# Patient Record
Sex: FEMALE | Race: WHITE | NOT HISPANIC OR LATINO | Employment: OTHER | URBAN - METROPOLITAN AREA
[De-identification: names, ages, dates, MRNs, and addresses within clinical notes are randomized per-mention and may not be internally consistent; named-entity substitution may affect disease eponyms.]

---

## 2017-01-01 ENCOUNTER — ANESTHESIA (OUTPATIENT)
Dept: PERIOP | Facility: HOSPITAL | Age: 82
End: 2017-01-01

## 2017-01-01 ENCOUNTER — APPOINTMENT (OUTPATIENT)
Dept: GENERAL RADIOLOGY | Facility: HOSPITAL | Age: 82
End: 2017-01-01

## 2017-01-01 ENCOUNTER — HOSPITAL ENCOUNTER (INPATIENT)
Facility: HOSPITAL | Age: 82
LOS: 5 days | Discharge: NURSING, REHAB OR LONG-TERM CARE | End: 2017-08-21
Attending: EMERGENCY MEDICINE | Admitting: INTERNAL MEDICINE

## 2017-01-01 ENCOUNTER — ANESTHESIA EVENT (OUTPATIENT)
Dept: PERIOP | Facility: HOSPITAL | Age: 82
End: 2017-01-01

## 2017-01-01 ENCOUNTER — APPOINTMENT (OUTPATIENT)
Dept: CT IMAGING | Facility: HOSPITAL | Age: 82
End: 2017-01-01

## 2017-01-01 ENCOUNTER — APPOINTMENT (OUTPATIENT)
Dept: CT IMAGING | Facility: HOSPITAL | Age: 82
End: 2017-01-01
Attending: HOSPITALIST

## 2017-01-01 ENCOUNTER — HOSPITAL ENCOUNTER (INPATIENT)
Facility: HOSPITAL | Age: 82
LOS: 6 days | End: 2017-09-20
Attending: INTERNAL MEDICINE | Admitting: INTERNAL MEDICINE

## 2017-01-01 VITALS
HEART RATE: 75 BPM | WEIGHT: 149.91 LBS | DIASTOLIC BLOOD PRESSURE: 69 MMHG | HEIGHT: 62 IN | OXYGEN SATURATION: 95 % | BODY MASS INDEX: 27.59 KG/M2 | RESPIRATION RATE: 16 BRPM | SYSTOLIC BLOOD PRESSURE: 120 MMHG | TEMPERATURE: 96.6 F

## 2017-01-01 VITALS
DIASTOLIC BLOOD PRESSURE: 82 MMHG | SYSTOLIC BLOOD PRESSURE: 150 MMHG | HEIGHT: 58 IN | TEMPERATURE: 98.5 F | OXYGEN SATURATION: 95 % | HEART RATE: 72 BPM | RESPIRATION RATE: 20 BRPM | BODY MASS INDEX: 28.74 KG/M2 | WEIGHT: 136.9 LBS

## 2017-01-01 DIAGNOSIS — D68.32 WARFARIN-INDUCED COAGULOPATHY (HCC): ICD-10-CM

## 2017-01-01 DIAGNOSIS — S09.90XA CLOSED HEAD INJURY, INITIAL ENCOUNTER: ICD-10-CM

## 2017-01-01 DIAGNOSIS — S72.012A SUBCAPITAL FRACTURE OF HIP, LEFT, CLOSED, INITIAL ENCOUNTER (HCC): Primary | ICD-10-CM

## 2017-01-01 DIAGNOSIS — Z96.649 INSTABILITY OF PROSTHETIC HIP, INITIAL ENCOUNTER (HCC): Primary | ICD-10-CM

## 2017-01-01 DIAGNOSIS — R26.2 DIFFICULTY WALKING: ICD-10-CM
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
## 2017-01-01 DIAGNOSIS — T84.028A INSTABILITY OF PROSTHETIC HIP, INITIAL ENCOUNTER (HCC): Primary | ICD-10-CM

## 2017-01-01 DIAGNOSIS — T45.515A WARFARIN-INDUCED COAGULOPATHY (HCC): ICD-10-CM

## 2017-01-01 LAB
ABO + RH BLD: NORMAL
ABO GROUP BLD: NORMAL
ABO GROUP BLD: NORMAL
ALBUMIN SERPL-MCNC: 2.8 G/DL (ref 3.5–5.2)
ALBUMIN SERPL-MCNC: 2.9 G/DL (ref 3.5–5.2)
ALBUMIN SERPL-MCNC: 3.1 G/DL (ref 3.5–5.2)
ALBUMIN SERPL-MCNC: 3.9 G/DL (ref 3.5–5.2)
ALBUMIN/GLOB SERPL: 0.8 G/DL
ALBUMIN/GLOB SERPL: 0.8 G/DL
ALBUMIN/GLOB SERPL: 0.9 G/DL
ALBUMIN/GLOB SERPL: 1 G/DL
ALP SERPL-CCNC: 101 U/L (ref 39–117)
ALP SERPL-CCNC: 102 U/L (ref 39–117)
ALP SERPL-CCNC: 97 U/L (ref 39–117)
ALP SERPL-CCNC: 99 U/L (ref 39–117)
ALT SERPL W P-5'-P-CCNC: 15 U/L (ref 1–33)
ALT SERPL W P-5'-P-CCNC: 15 U/L (ref 1–33)
ALT SERPL W P-5'-P-CCNC: 18 U/L (ref 1–33)
ALT SERPL W P-5'-P-CCNC: 25 U/L (ref 1–33)
ANION GAP SERPL CALCULATED.3IONS-SCNC: 11.1 MMOL/L
ANION GAP SERPL CALCULATED.3IONS-SCNC: 12.7 MMOL/L
ANION GAP SERPL CALCULATED.3IONS-SCNC: 13.2 MMOL/L
ANION GAP SERPL CALCULATED.3IONS-SCNC: 13.4 MMOL/L
ANION GAP SERPL CALCULATED.3IONS-SCNC: 14.2 MMOL/L
ANION GAP SERPL CALCULATED.3IONS-SCNC: 14.2 MMOL/L
ANION GAP SERPL CALCULATED.3IONS-SCNC: 15.6 MMOL/L
ANION GAP SERPL CALCULATED.3IONS-SCNC: 15.7 MMOL/L
ANION GAP SERPL CALCULATED.3IONS-SCNC: 15.9 MMOL/L
ANION GAP SERPL CALCULATED.3IONS-SCNC: 17.1 MMOL/L
AST SERPL-CCNC: 32 U/L (ref 1–32)
AST SERPL-CCNC: 57 U/L (ref 1–32)
AST SERPL-CCNC: 70 U/L (ref 1–32)
AST SERPL-CCNC: 96 U/L (ref 1–32)
BASOPHILS # BLD AUTO: 0 10*3/MM3 (ref 0–0.2)
BASOPHILS # BLD AUTO: 0 10*3/MM3 (ref 0–0.2)
BASOPHILS # BLD AUTO: 0.01 10*3/MM3 (ref 0–0.2)
BASOPHILS NFR BLD AUTO: 0 % (ref 0–1.5)
BASOPHILS NFR BLD AUTO: 0 % (ref 0–1.5)
BASOPHILS NFR BLD AUTO: 0.1 % (ref 0–1.5)
BASOPHILS NFR BLD AUTO: 0.2 % (ref 0–1.5)
BASOPHILS NFR BLD AUTO: 0.2 % (ref 0–1.5)
BH BB BLOOD EXPIRATION DATE: NORMAL
BH BB BLOOD TYPE BARCODE: 600
BH BB BLOOD TYPE BARCODE: 6200
BH BB BLOOD TYPE BARCODE: 6200
BH BB DISPENSE STATUS: NORMAL
BH BB PRODUCT CODE: NORMAL
BH BB UNIT NUMBER: NORMAL
BILIRUB SERPL-MCNC: 0.5 MG/DL (ref 0.1–1.2)
BILIRUB SERPL-MCNC: 0.7 MG/DL (ref 0.1–1.2)
BILIRUB SERPL-MCNC: 0.8 MG/DL (ref 0.1–1.2)
BILIRUB SERPL-MCNC: 0.9 MG/DL (ref 0.1–1.2)
BLD GP AB SCN SERPL QL: NEGATIVE
BLD GP AB SCN SERPL QL: NEGATIVE
BUN BLD-MCNC: 17 MG/DL (ref 8–23)
BUN BLD-MCNC: 18 MG/DL (ref 8–23)
BUN BLD-MCNC: 23 MG/DL (ref 8–23)
BUN BLD-MCNC: 23 MG/DL (ref 8–23)
BUN BLD-MCNC: 24 MG/DL (ref 8–23)
BUN BLD-MCNC: 25 MG/DL (ref 8–23)
BUN BLD-MCNC: 29 MG/DL (ref 8–23)
BUN BLD-MCNC: 34 MG/DL (ref 8–23)
BUN BLD-MCNC: 39 MG/DL (ref 8–23)
BUN BLD-MCNC: 40 MG/DL (ref 8–23)
BUN/CREAT SERPL: 23.6 (ref 7–25)
BUN/CREAT SERPL: 24 (ref 7–25)
BUN/CREAT SERPL: 25.3 (ref 7–25)
BUN/CREAT SERPL: 26.1 (ref 7–25)
BUN/CREAT SERPL: 34.9 (ref 7–25)
BUN/CREAT SERPL: 39.1 (ref 7–25)
BUN/CREAT SERPL: 39.1 (ref 7–25)
BUN/CREAT SERPL: 39.3 (ref 7–25)
BUN/CREAT SERPL: 47.1 (ref 7–25)
BUN/CREAT SERPL: 48.8 (ref 7–25)
CALCIUM SPEC-SCNC: 8.4 MG/DL (ref 8.6–10.5)
CALCIUM SPEC-SCNC: 8.5 MG/DL (ref 8.6–10.5)
CALCIUM SPEC-SCNC: 8.8 MG/DL (ref 8.6–10.5)
CALCIUM SPEC-SCNC: 8.9 MG/DL (ref 8.6–10.5)
CALCIUM SPEC-SCNC: 8.9 MG/DL (ref 8.6–10.5)
CALCIUM SPEC-SCNC: 9.1 MG/DL (ref 8.6–10.5)
CALCIUM SPEC-SCNC: 9.3 MG/DL (ref 8.6–10.5)
CALCIUM SPEC-SCNC: 9.3 MG/DL (ref 8.6–10.5)
CALCIUM SPEC-SCNC: 9.5 MG/DL (ref 8.6–10.5)
CALCIUM SPEC-SCNC: 9.6 MG/DL (ref 8.6–10.5)
CHLORIDE SERPL-SCNC: 100 MMOL/L (ref 98–107)
CHLORIDE SERPL-SCNC: 101 MMOL/L (ref 98–107)
CHLORIDE SERPL-SCNC: 105 MMOL/L (ref 98–107)
CHLORIDE SERPL-SCNC: 94 MMOL/L (ref 98–107)
CHLORIDE SERPL-SCNC: 98 MMOL/L (ref 98–107)
CHLORIDE SERPL-SCNC: 98 MMOL/L (ref 98–107)
CHLORIDE SERPL-SCNC: 99 MMOL/L (ref 98–107)
CHLORIDE SERPL-SCNC: 99 MMOL/L (ref 98–107)
CO2 SERPL-SCNC: 17.4 MMOL/L (ref 22–29)
CO2 SERPL-SCNC: 17.9 MMOL/L (ref 22–29)
CO2 SERPL-SCNC: 19.8 MMOL/L (ref 22–29)
CO2 SERPL-SCNC: 20.3 MMOL/L (ref 22–29)
CO2 SERPL-SCNC: 20.9 MMOL/L (ref 22–29)
CO2 SERPL-SCNC: 21.1 MMOL/L (ref 22–29)
CO2 SERPL-SCNC: 21.6 MMOL/L (ref 22–29)
CO2 SERPL-SCNC: 24.3 MMOL/L (ref 22–29)
CREAT BLD-MCNC: 0.61 MG/DL (ref 0.57–1)
CREAT BLD-MCNC: 0.64 MG/DL (ref 0.57–1)
CREAT BLD-MCNC: 0.72 MG/DL (ref 0.57–1)
CREAT BLD-MCNC: 0.75 MG/DL (ref 0.57–1)
CREAT BLD-MCNC: 0.8 MG/DL (ref 0.57–1)
CREAT BLD-MCNC: 0.83 MG/DL (ref 0.57–1)
CREAT BLD-MCNC: 0.85 MG/DL (ref 0.57–1)
CREAT BLD-MCNC: 0.87 MG/DL (ref 0.57–1)
CREAT BLD-MCNC: 0.88 MG/DL (ref 0.57–1)
CREAT BLD-MCNC: 0.91 MG/DL (ref 0.57–1)
DEPRECATED RDW RBC AUTO: 58 FL (ref 37–54)
DEPRECATED RDW RBC AUTO: 58.6 FL (ref 37–54)
DEPRECATED RDW RBC AUTO: 58.7 FL (ref 37–54)
DEPRECATED RDW RBC AUTO: 60.8 FL (ref 37–54)
DEPRECATED RDW RBC AUTO: 61 FL (ref 37–54)
DEPRECATED RDW RBC AUTO: 66 FL (ref 37–54)
DEPRECATED RDW RBC AUTO: 66.1 FL (ref 37–54)
DEPRECATED RDW RBC AUTO: 66.3 FL (ref 37–54)
DEPRECATED RDW RBC AUTO: 66.4 FL (ref 37–54)
DEPRECATED RDW RBC AUTO: 67 FL (ref 37–54)
EOSINOPHIL # BLD AUTO: 0.02 10*3/MM3 (ref 0–0.7)
EOSINOPHIL # BLD AUTO: 0.04 10*3/MM3 (ref 0–0.7)
EOSINOPHIL # BLD AUTO: 0.05 10*3/MM3 (ref 0–0.7)
EOSINOPHIL # BLD AUTO: 0.07 10*3/MM3 (ref 0–0.7)
EOSINOPHIL # BLD AUTO: 0.1 10*3/MM3 (ref 0–0.7)
EOSINOPHIL # BLD AUTO: 0.11 10*3/MM3 (ref 0–0.7)
EOSINOPHIL # BLD AUTO: 0.14 10*3/MM3 (ref 0–0.7)
EOSINOPHIL # BLD AUTO: 0.17 10*3/MM3 (ref 0–0.7)
EOSINOPHIL NFR BLD AUTO: 0.3 % (ref 0.3–6.2)
EOSINOPHIL NFR BLD AUTO: 0.7 % (ref 0.3–6.2)
EOSINOPHIL NFR BLD AUTO: 0.7 % (ref 0.3–6.2)
EOSINOPHIL NFR BLD AUTO: 0.8 % (ref 0.3–6.2)
EOSINOPHIL NFR BLD AUTO: 1.9 % (ref 0.3–6.2)
EOSINOPHIL NFR BLD AUTO: 1.9 % (ref 0.3–6.2)
EOSINOPHIL NFR BLD AUTO: 2.3 % (ref 0.3–6.2)
EOSINOPHIL NFR BLD AUTO: 2.5 % (ref 0.3–6.2)
ERYTHROCYTE [DISTWIDTH] IN BLOOD BY AUTOMATED COUNT: 15.3 % (ref 11.7–13)
ERYTHROCYTE [DISTWIDTH] IN BLOOD BY AUTOMATED COUNT: 15.4 % (ref 11.7–13)
ERYTHROCYTE [DISTWIDTH] IN BLOOD BY AUTOMATED COUNT: 15.5 % (ref 11.7–13)
ERYTHROCYTE [DISTWIDTH] IN BLOOD BY AUTOMATED COUNT: 15.9 % (ref 11.7–13)
ERYTHROCYTE [DISTWIDTH] IN BLOOD BY AUTOMATED COUNT: 15.9 % (ref 11.7–13)
ERYTHROCYTE [DISTWIDTH] IN BLOOD BY AUTOMATED COUNT: 18.1 % (ref 11.7–13)
ERYTHROCYTE [DISTWIDTH] IN BLOOD BY AUTOMATED COUNT: 18.2 % (ref 11.7–13)
ERYTHROCYTE [DISTWIDTH] IN BLOOD BY AUTOMATED COUNT: 18.2 % (ref 11.7–13)
ERYTHROCYTE [DISTWIDTH] IN BLOOD BY AUTOMATED COUNT: 18.3 % (ref 11.7–13)
ERYTHROCYTE [DISTWIDTH] IN BLOOD BY AUTOMATED COUNT: 18.6 % (ref 11.7–13)
GFR SERPL CREATININE-BSD FRML MDRD: 58 ML/MIN/1.73
GFR SERPL CREATININE-BSD FRML MDRD: 61 ML/MIN/1.73
GFR SERPL CREATININE-BSD FRML MDRD: 62 ML/MIN/1.73
GFR SERPL CREATININE-BSD FRML MDRD: 63 ML/MIN/1.73
GFR SERPL CREATININE-BSD FRML MDRD: 65 ML/MIN/1.73
GFR SERPL CREATININE-BSD FRML MDRD: 68 ML/MIN/1.73
GFR SERPL CREATININE-BSD FRML MDRD: 73 ML/MIN/1.73
GFR SERPL CREATININE-BSD FRML MDRD: 77 ML/MIN/1.73
GFR SERPL CREATININE-BSD FRML MDRD: 88 ML/MIN/1.73
GFR SERPL CREATININE-BSD FRML MDRD: 93 ML/MIN/1.73
GLOBULIN UR ELPH-MCNC: 3.4 GM/DL
GLOBULIN UR ELPH-MCNC: 3.5 GM/DL
GLOBULIN UR ELPH-MCNC: 3.5 GM/DL
GLOBULIN UR ELPH-MCNC: 4 GM/DL
GLUCOSE BLD-MCNC: 102 MG/DL (ref 65–99)
GLUCOSE BLD-MCNC: 104 MG/DL (ref 65–99)
GLUCOSE BLD-MCNC: 107 MG/DL (ref 65–99)
GLUCOSE BLD-MCNC: 109 MG/DL (ref 65–99)
GLUCOSE BLD-MCNC: 111 MG/DL (ref 65–99)
GLUCOSE BLD-MCNC: 112 MG/DL (ref 65–99)
GLUCOSE BLD-MCNC: 112 MG/DL (ref 65–99)
GLUCOSE BLD-MCNC: 115 MG/DL (ref 65–99)
GLUCOSE BLD-MCNC: 117 MG/DL (ref 65–99)
GLUCOSE BLD-MCNC: 98 MG/DL (ref 65–99)
GLUCOSE BLDC GLUCOMTR-MCNC: 110 MG/DL (ref 70–130)
HCT VFR BLD AUTO: 23.7 % (ref 35.6–45.5)
HCT VFR BLD AUTO: 26.1 % (ref 35.6–45.5)
HCT VFR BLD AUTO: 27.9 % (ref 35.6–45.5)
HCT VFR BLD AUTO: 27.9 % (ref 35.6–45.5)
HCT VFR BLD AUTO: 28.5 % (ref 35.6–45.5)
HCT VFR BLD AUTO: 29.3 % (ref 35.6–45.5)
HCT VFR BLD AUTO: 30.7 % (ref 35.6–45.5)
HCT VFR BLD AUTO: 31.8 % (ref 35.6–45.5)
HCT VFR BLD AUTO: 32.4 % (ref 35.6–45.5)
HCT VFR BLD AUTO: 33.7 % (ref 35.6–45.5)
HGB BLD-MCNC: 10.1 G/DL (ref 11.9–15.5)
HGB BLD-MCNC: 10.1 G/DL (ref 11.9–15.5)
HGB BLD-MCNC: 10.2 G/DL (ref 11.9–15.5)
HGB BLD-MCNC: 10.8 G/DL (ref 11.9–15.5)
HGB BLD-MCNC: 7.9 G/DL (ref 11.9–15.5)
HGB BLD-MCNC: 8.2 G/DL (ref 11.9–15.5)
HGB BLD-MCNC: 8.8 G/DL (ref 11.9–15.5)
HGB BLD-MCNC: 8.9 G/DL (ref 11.9–15.5)
HGB BLD-MCNC: 9.2 G/DL (ref 11.9–15.5)
HGB BLD-MCNC: 9.6 G/DL (ref 11.9–15.5)
IMM GRANULOCYTES # BLD: 0 10*3/MM3 (ref 0–0.03)
IMM GRANULOCYTES # BLD: 0.02 10*3/MM3 (ref 0–0.03)
IMM GRANULOCYTES # BLD: 0.03 10*3/MM3 (ref 0–0.03)
IMM GRANULOCYTES # BLD: 0.03 10*3/MM3 (ref 0–0.03)
IMM GRANULOCYTES NFR BLD: 0 % (ref 0–0.5)
IMM GRANULOCYTES NFR BLD: 0.3 % (ref 0–0.5)
IMM GRANULOCYTES NFR BLD: 0.4 % (ref 0–0.5)
IMM GRANULOCYTES NFR BLD: 0.4 % (ref 0–0.5)
INR PPP: 1.29 (ref 0.9–1.1)
INR PPP: 1.34 (ref 0.9–1.1)
INR PPP: 1.39 (ref 0.9–1.1)
INR PPP: 1.49 (ref 0.9–1.1)
INR PPP: 1.82 (ref 0.9–1.1)
INR PPP: 1.86 (ref 0.9–1.1)
INR PPP: 1.88 (ref 0.9–1.1)
INR PPP: 3.69 (ref 0.9–1.1)
INR PPP: 4.4 (ref 0.9–1.1)
INR PPP: 4.45 (ref 0.9–1.1)
INR PPP: 4.48 (ref 0.9–1.1)
INR PPP: 5.63 (ref 0.9–1.1)
INR PPP: 5.84 (ref 0.9–1.1)
LYMPHOCYTES # BLD AUTO: 1.27 10*3/MM3 (ref 0.9–4.8)
LYMPHOCYTES # BLD AUTO: 1.43 10*3/MM3 (ref 0.9–4.8)
LYMPHOCYTES # BLD AUTO: 1.53 10*3/MM3 (ref 0.9–4.8)
LYMPHOCYTES # BLD AUTO: 1.6 10*3/MM3 (ref 0.9–4.8)
LYMPHOCYTES # BLD AUTO: 1.75 10*3/MM3 (ref 0.9–4.8)
LYMPHOCYTES # BLD AUTO: 1.76 10*3/MM3 (ref 0.9–4.8)
LYMPHOCYTES # BLD AUTO: 2.19 10*3/MM3 (ref 0.9–4.8)
LYMPHOCYTES # BLD AUTO: 2.2 10*3/MM3 (ref 0.9–4.8)
LYMPHOCYTES NFR BLD AUTO: 19.2 % (ref 19.6–45.3)
LYMPHOCYTES NFR BLD AUTO: 21.1 % (ref 19.6–45.3)
LYMPHOCYTES NFR BLD AUTO: 21.9 % (ref 19.6–45.3)
LYMPHOCYTES NFR BLD AUTO: 23.4 % (ref 19.6–45.3)
LYMPHOCYTES NFR BLD AUTO: 23.5 % (ref 19.6–45.3)
LYMPHOCYTES NFR BLD AUTO: 23.9 % (ref 19.6–45.3)
LYMPHOCYTES NFR BLD AUTO: 32.7 % (ref 19.6–45.3)
LYMPHOCYTES NFR BLD AUTO: 45.1 % (ref 19.6–45.3)
MCH RBC QN AUTO: 31.3 PG (ref 26.9–32)
MCH RBC QN AUTO: 31.7 PG (ref 26.9–32)
MCH RBC QN AUTO: 31.9 PG (ref 26.9–32)
MCH RBC QN AUTO: 32 PG (ref 26.9–32)
MCH RBC QN AUTO: 32.4 PG (ref 26.9–32)
MCH RBC QN AUTO: 32.7 PG (ref 26.9–32)
MCH RBC QN AUTO: 33.2 PG (ref 26.9–32)
MCH RBC QN AUTO: 33.6 PG (ref 26.9–32)
MCH RBC QN AUTO: 33.9 PG (ref 26.9–32)
MCH RBC QN AUTO: 34.6 PG (ref 26.9–32)
MCHC RBC AUTO-ENTMCNC: 31.2 G/DL (ref 32.4–36.3)
MCHC RBC AUTO-ENTMCNC: 31.4 G/DL (ref 32.4–36.3)
MCHC RBC AUTO-ENTMCNC: 31.5 G/DL (ref 32.4–36.3)
MCHC RBC AUTO-ENTMCNC: 31.9 G/DL (ref 32.4–36.3)
MCHC RBC AUTO-ENTMCNC: 32 G/DL (ref 32.4–36.3)
MCHC RBC AUTO-ENTMCNC: 32.1 G/DL (ref 32.4–36.3)
MCHC RBC AUTO-ENTMCNC: 32.3 G/DL (ref 32.4–36.3)
MCHC RBC AUTO-ENTMCNC: 32.8 G/DL (ref 32.4–36.3)
MCHC RBC AUTO-ENTMCNC: 32.9 G/DL (ref 32.4–36.3)
MCHC RBC AUTO-ENTMCNC: 33.3 G/DL (ref 32.4–36.3)
MCV RBC AUTO: 101 FL (ref 80.5–98.2)
MCV RBC AUTO: 101.1 FL (ref 80.5–98.2)
MCV RBC AUTO: 103.5 FL (ref 80.5–98.2)
MCV RBC AUTO: 103.7 FL (ref 80.5–98.2)
MCV RBC AUTO: 103.9 FL (ref 80.5–98.2)
MCV RBC AUTO: 104 FL (ref 80.5–98.2)
MCV RBC AUTO: 104.9 FL (ref 80.5–98.2)
MCV RBC AUTO: 97.2 FL (ref 80.5–98.2)
MCV RBC AUTO: 99.3 FL (ref 80.5–98.2)
MCV RBC AUTO: 99.6 FL (ref 80.5–98.2)
MONOCYTES # BLD AUTO: 0.44 10*3/MM3 (ref 0.2–1.2)
MONOCYTES # BLD AUTO: 0.59 10*3/MM3 (ref 0.2–1.2)
MONOCYTES # BLD AUTO: 0.6 10*3/MM3 (ref 0.2–1.2)
MONOCYTES # BLD AUTO: 0.68 10*3/MM3 (ref 0.2–1.2)
MONOCYTES # BLD AUTO: 0.74 10*3/MM3 (ref 0.2–1.2)
MONOCYTES # BLD AUTO: 0.76 10*3/MM3 (ref 0.2–1.2)
MONOCYTES # BLD AUTO: 0.81 10*3/MM3 (ref 0.2–1.2)
MONOCYTES # BLD AUTO: 0.82 10*3/MM3 (ref 0.2–1.2)
MONOCYTES NFR BLD AUTO: 10.9 % (ref 5–12)
MONOCYTES NFR BLD AUTO: 11.1 % (ref 5–12)
MONOCYTES NFR BLD AUTO: 12.1 % (ref 5–12)
MONOCYTES NFR BLD AUTO: 9 % (ref 5–12)
MONOCYTES NFR BLD AUTO: 9 % (ref 5–12)
MONOCYTES NFR BLD AUTO: 9.4 % (ref 5–12)
MONOCYTES NFR BLD AUTO: 9.5 % (ref 5–12)
MONOCYTES NFR BLD AUTO: 9.8 % (ref 5–12)
NEUTROPHILS # BLD AUTO: 2.1 10*3/MM3 (ref 1.9–8.1)
NEUTROPHILS # BLD AUTO: 3.43 10*3/MM3 (ref 1.9–8.1)
NEUTROPHILS # BLD AUTO: 3.51 10*3/MM3 (ref 1.9–8.1)
NEUTROPHILS # BLD AUTO: 4.02 10*3/MM3 (ref 1.9–8.1)
NEUTROPHILS # BLD AUTO: 4.26 10*3/MM3 (ref 1.9–8.1)
NEUTROPHILS # BLD AUTO: 4.89 10*3/MM3 (ref 1.9–8.1)
NEUTROPHILS # BLD AUTO: 5.43 10*3/MM3 (ref 1.9–8.1)
NEUTROPHILS # BLD AUTO: 6.47 10*3/MM3 (ref 1.9–8.1)
NEUTROPHILS NFR BLD AUTO: 43 % (ref 42.7–76)
NEUTROPHILS NFR BLD AUTO: 52.6 % (ref 42.7–76)
NEUTROPHILS NFR BLD AUTO: 63.5 % (ref 42.7–76)
NEUTROPHILS NFR BLD AUTO: 63.8 % (ref 42.7–76)
NEUTROPHILS NFR BLD AUTO: 65.8 % (ref 42.7–76)
NEUTROPHILS NFR BLD AUTO: 67.6 % (ref 42.7–76)
NEUTROPHILS NFR BLD AUTO: 67.9 % (ref 42.7–76)
NEUTROPHILS NFR BLD AUTO: 70.6 % (ref 42.7–76)
NRBC BLD MANUAL-RTO: 0 /100 WBC (ref 0–0)
NRBC BLD MANUAL-RTO: 0 /100 WBC (ref 0–0)
PLATELET # BLD AUTO: 129 10*3/MM3 (ref 140–500)
PLATELET # BLD AUTO: 148 10*3/MM3 (ref 140–500)
PLATELET # BLD AUTO: 150 10*3/MM3 (ref 140–500)
PLATELET # BLD AUTO: 169 10*3/MM3 (ref 140–500)
PLATELET # BLD AUTO: 183 10*3/MM3 (ref 140–500)
PLATELET # BLD AUTO: 192 10*3/MM3 (ref 140–500)
PLATELET # BLD AUTO: 202 10*3/MM3 (ref 140–500)
PLATELET # BLD AUTO: 206 10*3/MM3 (ref 140–500)
PLATELET # BLD AUTO: 214 10*3/MM3 (ref 140–500)
PLATELET # BLD AUTO: 222 10*3/MM3 (ref 140–500)
PMV BLD AUTO: 8.5 FL (ref 6–12)
PMV BLD AUTO: 8.9 FL (ref 6–12)
PMV BLD AUTO: 9.1 FL (ref 6–12)
PMV BLD AUTO: 9.2 FL (ref 6–12)
PMV BLD AUTO: 9.2 FL (ref 6–12)
PMV BLD AUTO: 9.3 FL (ref 6–12)
PMV BLD AUTO: 9.3 FL (ref 6–12)
PMV BLD AUTO: 9.4 FL (ref 6–12)
PMV BLD AUTO: 9.4 FL (ref 6–12)
PMV BLD AUTO: 9.8 FL (ref 6–12)
POTASSIUM BLD-SCNC: 3.7 MMOL/L (ref 3.5–5.2)
POTASSIUM BLD-SCNC: 3.8 MMOL/L (ref 3.5–5.2)
POTASSIUM BLD-SCNC: 3.9 MMOL/L (ref 3.5–5.2)
POTASSIUM BLD-SCNC: 4 MMOL/L (ref 3.5–5.2)
POTASSIUM BLD-SCNC: 4.1 MMOL/L (ref 3.5–5.2)
POTASSIUM BLD-SCNC: 4.2 MMOL/L (ref 3.5–5.2)
POTASSIUM BLD-SCNC: 4.3 MMOL/L (ref 3.5–5.2)
POTASSIUM BLD-SCNC: 4.6 MMOL/L (ref 3.5–5.2)
POTASSIUM BLD-SCNC: 4.6 MMOL/L (ref 3.5–5.2)
POTASSIUM BLD-SCNC: 5.3 MMOL/L (ref 3.5–5.2)
PROT SERPL-MCNC: 6.2 G/DL (ref 6–8.5)
PROT SERPL-MCNC: 6.4 G/DL (ref 6–8.5)
PROT SERPL-MCNC: 6.6 G/DL (ref 6–8.5)
PROT SERPL-MCNC: 7.9 G/DL (ref 6–8.5)
PROTHROMBIN TIME: 15.7 SECONDS (ref 11.7–14.2)
PROTHROMBIN TIME: 16.1 SECONDS (ref 11.7–14.2)
PROTHROMBIN TIME: 16.6 SECONDS (ref 11.7–14.2)
PROTHROMBIN TIME: 17.5 SECONDS (ref 11.7–14.2)
PROTHROMBIN TIME: 20.4 SECONDS (ref 11.7–14.2)
PROTHROMBIN TIME: 20.8 SECONDS (ref 11.7–14.2)
PROTHROMBIN TIME: 20.9 SECONDS (ref 11.7–14.2)
PROTHROMBIN TIME: 35.5 SECONDS (ref 11.7–14.2)
PROTHROMBIN TIME: 40.7 SECONDS (ref 11.7–14.2)
PROTHROMBIN TIME: 41.1 SECONDS (ref 11.7–14.2)
PROTHROMBIN TIME: 41.3 SECONDS (ref 11.7–14.2)
PROTHROMBIN TIME: 49.4 SECONDS (ref 11.7–14.2)
PROTHROMBIN TIME: 50.8 SECONDS (ref 11.7–14.2)
RBC # BLD AUTO: 2.28 10*6/MM3 (ref 3.9–5.2)
RBC # BLD AUTO: 2.62 10*6/MM3 (ref 3.9–5.2)
RBC # BLD AUTO: 2.74 10*6/MM3 (ref 3.9–5.2)
RBC # BLD AUTO: 2.76 10*6/MM3 (ref 3.9–5.2)
RBC # BLD AUTO: 2.81 10*6/MM3 (ref 3.9–5.2)
RBC # BLD AUTO: 2.83 10*6/MM3 (ref 3.9–5.2)
RBC # BLD AUTO: 3.09 10*6/MM3 (ref 3.9–5.2)
RBC # BLD AUTO: 3.15 10*6/MM3 (ref 3.9–5.2)
RBC # BLD AUTO: 3.16 10*6/MM3 (ref 3.9–5.2)
RBC # BLD AUTO: 3.25 10*6/MM3 (ref 3.9–5.2)
RH BLD: POSITIVE
RH BLD: POSITIVE
SODIUM BLD-SCNC: 130 MMOL/L (ref 136–145)
SODIUM BLD-SCNC: 131 MMOL/L (ref 136–145)
SODIUM BLD-SCNC: 132 MMOL/L (ref 136–145)
SODIUM BLD-SCNC: 133 MMOL/L (ref 136–145)
SODIUM BLD-SCNC: 134 MMOL/L (ref 136–145)
SODIUM BLD-SCNC: 134 MMOL/L (ref 136–145)
SODIUM BLD-SCNC: 135 MMOL/L (ref 136–145)
SODIUM BLD-SCNC: 135 MMOL/L (ref 136–145)
SODIUM BLD-SCNC: 137 MMOL/L (ref 136–145)
SODIUM BLD-SCNC: 139 MMOL/L (ref 136–145)
TROPONIN T SERPL-MCNC: 2.2 NG/ML (ref 0–0.03)
TROPONIN T SERPL-MCNC: 3.05 NG/ML (ref 0–0.03)
TROPONIN T SERPL-MCNC: <0.01 NG/ML (ref 0–0.03)
TSH SERPL DL<=0.05 MIU/L-ACNC: 10.39 MIU/ML (ref 0.27–4.2)
TSH SERPL DL<=0.05 MIU/L-ACNC: 5.7 MIU/ML (ref 0.27–4.2)
UNIT  ABO: NORMAL
UNIT  RH: NORMAL
WBC NRBC COR # BLD: 12.45 10*3/MM3 (ref 4.5–10.7)
WBC NRBC COR # BLD: 4.88 10*3/MM3 (ref 4.5–10.7)
WBC NRBC COR # BLD: 5.4 10*3/MM3 (ref 4.5–10.7)
WBC NRBC COR # BLD: 6.11 10*3/MM3 (ref 4.5–10.7)
WBC NRBC COR # BLD: 6.69 10*3/MM3 (ref 4.5–10.7)
WBC NRBC COR # BLD: 6.69 10*3/MM3 (ref 4.5–10.7)
WBC NRBC COR # BLD: 7.24 10*3/MM3 (ref 4.5–10.7)
WBC NRBC COR # BLD: 7.55 10*3/MM3 (ref 4.5–10.7)
WBC NRBC COR # BLD: 7.99 10*3/MM3 (ref 4.5–10.7)
WBC NRBC COR # BLD: 9.16 10*3/MM3 (ref 4.5–10.7)

## 2017-01-01 PROCEDURE — 80048 BASIC METABOLIC PNL TOTAL CA: CPT | Performed by: EMERGENCY MEDICINE

## 2017-01-01 PROCEDURE — 86900 BLOOD TYPING SEROLOGIC ABO: CPT | Performed by: HOSPITALIST

## 2017-01-01 PROCEDURE — 97110 THERAPEUTIC EXERCISES: CPT

## 2017-01-01 PROCEDURE — 36430 TRANSFUSION BLD/BLD COMPNT: CPT

## 2017-01-01 PROCEDURE — 25010000002 FENTANYL CITRATE (PF) 100 MCG/2ML SOLUTION: Performed by: ANESTHESIOLOGY

## 2017-01-01 PROCEDURE — 25010000002 PROPOFOL 10 MG/ML EMULSION: Performed by: NURSE ANESTHETIST, CERTIFIED REGISTERED

## 2017-01-01 PROCEDURE — 25010000003 CEFAZOLIN IN D5W 1 GM/50ML SOLUTION: Performed by: ORTHOPAEDIC SURGERY

## 2017-01-01 PROCEDURE — 86850 RBC ANTIBODY SCREEN: CPT | Performed by: HOSPITALIST

## 2017-01-01 PROCEDURE — 97162 PT EVAL MOD COMPLEX 30 MIN: CPT

## 2017-01-01 PROCEDURE — 25010000002 MORPHINE PER 10 MG: Performed by: INTERNAL MEDICINE

## 2017-01-01 PROCEDURE — 80053 COMPREHEN METABOLIC PANEL: CPT | Performed by: HOSPITALIST

## 2017-01-01 PROCEDURE — 85025 COMPLETE CBC W/AUTO DIFF WBC: CPT | Performed by: HOSPITALIST

## 2017-01-01 PROCEDURE — 0SRS0JZ REPLACEMENT OF LEFT HIP JOINT, FEMORAL SURFACE WITH SYNTHETIC SUBSTITUTE, OPEN APPROACH: ICD-10-PCS | Performed by: ORTHOPAEDIC SURGERY

## 2017-01-01 PROCEDURE — C1713 ANCHOR/SCREW BN/BN,TIS/BN: HCPCS | Performed by: ORTHOPAEDIC SURGERY

## 2017-01-01 PROCEDURE — 85610 PROTHROMBIN TIME: CPT | Performed by: INTERNAL MEDICINE

## 2017-01-01 PROCEDURE — 25010000002 SUCCINYLCHOLINE PER 20 MG: Performed by: ANESTHESIOLOGY

## 2017-01-01 PROCEDURE — 84443 ASSAY THYROID STIM HORMONE: CPT | Performed by: HOSPITALIST

## 2017-01-01 PROCEDURE — 93005 ELECTROCARDIOGRAM TRACING: CPT | Performed by: ANESTHESIOLOGY

## 2017-01-01 PROCEDURE — 85027 COMPLETE CBC AUTOMATED: CPT | Performed by: INTERNAL MEDICINE

## 2017-01-01 PROCEDURE — 80048 BASIC METABOLIC PNL TOTAL CA: CPT | Performed by: HOSPITALIST

## 2017-01-01 PROCEDURE — 82962 GLUCOSE BLOOD TEST: CPT

## 2017-01-01 PROCEDURE — P9017 PLASMA 1 DONOR FRZ W/IN 8 HR: HCPCS

## 2017-01-01 PROCEDURE — 70450 CT HEAD/BRAIN W/O DYE: CPT

## 2017-01-01 PROCEDURE — 84484 ASSAY OF TROPONIN QUANT: CPT | Performed by: INTERNAL MEDICINE

## 2017-01-01 PROCEDURE — 25010000002 VITAMIN K1 1 MG/1 ML SOLUTION: Performed by: HOSPITALIST

## 2017-01-01 PROCEDURE — 80053 COMPREHEN METABOLIC PANEL: CPT | Performed by: INTERNAL MEDICINE

## 2017-01-01 PROCEDURE — 25010000002 ENOXAPARIN PER 10 MG: Performed by: ORTHOPAEDIC SURGERY

## 2017-01-01 PROCEDURE — 25010000002 VITAMIN K1 1 MG/1 ML SOLUTION: Performed by: INTERNAL MEDICINE

## 2017-01-01 PROCEDURE — 0SWBXJZ REVISION OF SYNTHETIC SUBSTITUTE IN LEFT HIP JOINT, EXTERNAL APPROACH: ICD-10-PCS | Performed by: ORTHOPAEDIC SURGERY

## 2017-01-01 PROCEDURE — 25010000002 LORAZEPAM PER 2 MG: Performed by: HOSPITALIST

## 2017-01-01 PROCEDURE — 25010000002 MORPHINE PER 10 MG: Performed by: HOSPITALIST

## 2017-01-01 PROCEDURE — 25010000002 ONDANSETRON PER 1 MG: Performed by: EMERGENCY MEDICINE

## 2017-01-01 PROCEDURE — 76000 FLUOROSCOPY <1 HR PHYS/QHP: CPT

## 2017-01-01 PROCEDURE — 25010000002 PHENYLEPHRINE PER 1 ML: Performed by: ANESTHESIOLOGY

## 2017-01-01 PROCEDURE — 25010000002 MIDAZOLAM PER 1 MG: Performed by: ANESTHESIOLOGY

## 2017-01-01 PROCEDURE — 85610 PROTHROMBIN TIME: CPT | Performed by: HOSPITALIST

## 2017-01-01 PROCEDURE — 92526 ORAL FUNCTION THERAPY: CPT

## 2017-01-01 PROCEDURE — C1776 JOINT DEVICE (IMPLANTABLE): HCPCS | Performed by: ORTHOPAEDIC SURGERY

## 2017-01-01 PROCEDURE — 85025 COMPLETE CBC W/AUTO DIFF WBC: CPT | Performed by: EMERGENCY MEDICINE

## 2017-01-01 PROCEDURE — P9016 RBC LEUKOCYTES REDUCED: HCPCS

## 2017-01-01 PROCEDURE — 72170 X-RAY EXAM OF PELVIS: CPT

## 2017-01-01 PROCEDURE — 73501 X-RAY EXAM HIP UNI 1 VIEW: CPT

## 2017-01-01 PROCEDURE — 25010000002 KETOROLAC TROMETHAMINE PER 15 MG: Performed by: ORTHOPAEDIC SURGERY

## 2017-01-01 PROCEDURE — 99284 EMERGENCY DEPT VISIT MOD MDM: CPT

## 2017-01-01 PROCEDURE — 25010000002 MORPHINE SULFATE (PF) 2 MG/ML SOLUTION: Performed by: INTERNAL MEDICINE

## 2017-01-01 PROCEDURE — 25010000002 ONDANSETRON PER 1 MG: Performed by: NURSE ANESTHETIST, CERTIFIED REGISTERED

## 2017-01-01 PROCEDURE — 86927 PLASMA FRESH FROZEN: CPT

## 2017-01-01 PROCEDURE — 99222 1ST HOSP IP/OBS MODERATE 55: CPT | Performed by: INTERNAL MEDICINE

## 2017-01-01 PROCEDURE — 86900 BLOOD TYPING SEROLOGIC ABO: CPT

## 2017-01-01 PROCEDURE — 25010000002 MORPHINE PER 10 MG: Performed by: EMERGENCY MEDICINE

## 2017-01-01 PROCEDURE — 86901 BLOOD TYPING SEROLOGIC RH(D): CPT | Performed by: EMERGENCY MEDICINE

## 2017-01-01 PROCEDURE — 25010000002 CLONIDINE PER 1 MG: Performed by: ORTHOPAEDIC SURGERY

## 2017-01-01 PROCEDURE — 25010000002 ROPIVACAINE PER 1 MG: Performed by: ORTHOPAEDIC SURGERY

## 2017-01-01 PROCEDURE — 25010000002 KETOROLAC TROMETHAMINE PER 15 MG: Performed by: NURSE ANESTHETIST, CERTIFIED REGISTERED

## 2017-01-01 PROCEDURE — 84443 ASSAY THYROID STIM HORMONE: CPT | Performed by: INTERNAL MEDICINE

## 2017-01-01 PROCEDURE — 86900 BLOOD TYPING SEROLOGIC ABO: CPT | Performed by: EMERGENCY MEDICINE

## 2017-01-01 PROCEDURE — 99231 SBSQ HOSP IP/OBS SF/LOW 25: CPT | Performed by: INTERNAL MEDICINE

## 2017-01-01 PROCEDURE — 84484 ASSAY OF TROPONIN QUANT: CPT | Performed by: HOSPITALIST

## 2017-01-01 PROCEDURE — 85610 PROTHROMBIN TIME: CPT | Performed by: ORTHOPAEDIC SURGERY

## 2017-01-01 PROCEDURE — 86901 BLOOD TYPING SEROLOGIC RH(D): CPT | Performed by: HOSPITALIST

## 2017-01-01 PROCEDURE — 93010 ELECTROCARDIOGRAM REPORT: CPT | Performed by: INTERNAL MEDICINE

## 2017-01-01 PROCEDURE — 99232 SBSQ HOSP IP/OBS MODERATE 35: CPT | Performed by: INTERNAL MEDICINE

## 2017-01-01 PROCEDURE — 25010000002 ONDANSETRON PER 1 MG: Performed by: INTERNAL MEDICINE

## 2017-01-01 PROCEDURE — 80048 BASIC METABOLIC PNL TOTAL CA: CPT | Performed by: INTERNAL MEDICINE

## 2017-01-01 PROCEDURE — 25010000002 PROPOFOL 1000 MG/ML EMULSION: Performed by: ANESTHESIOLOGY

## 2017-01-01 PROCEDURE — 73502 X-RAY EXAM HIP UNI 2-3 VIEWS: CPT

## 2017-01-01 PROCEDURE — 86923 COMPATIBILITY TEST ELECTRIC: CPT

## 2017-01-01 PROCEDURE — 85610 PROTHROMBIN TIME: CPT | Performed by: EMERGENCY MEDICINE

## 2017-01-01 PROCEDURE — 25010000002 NEOSTIGMINE PER 0.5 MG: Performed by: NURSE ANESTHETIST, CERTIFIED REGISTERED

## 2017-01-01 PROCEDURE — 92610 EVALUATE SWALLOWING FUNCTION: CPT

## 2017-01-01 PROCEDURE — 86850 RBC ANTIBODY SCREEN: CPT | Performed by: EMERGENCY MEDICINE

## 2017-01-01 DEVICE — IMPLANTABLE DEVICE: Type: IMPLANTABLE DEVICE | Site: HIP | Status: FUNCTIONAL

## 2017-01-01 DEVICE — CMT BONE SIMPLEX/P TMYCIN FDOS SGL: Type: IMPLANTABLE DEVICE | Site: HIP | Status: FUNCTIONAL

## 2017-01-01 RX ORDER — FENTANYL CITRATE 50 UG/ML
25 INJECTION, SOLUTION INTRAMUSCULAR; INTRAVENOUS
Status: DISCONTINUED | OUTPATIENT
Start: 2017-01-01 | End: 2017-01-01 | Stop reason: HOSPADM

## 2017-01-01 RX ORDER — EPHEDRINE SULFATE 50 MG/ML
INJECTION, SOLUTION INTRAVENOUS AS NEEDED
Status: DISCONTINUED | OUTPATIENT
Start: 2017-01-01 | End: 2017-01-01 | Stop reason: SURG

## 2017-01-01 RX ORDER — LIDOCAINE HYDROCHLORIDE 10 MG/ML
0.5 INJECTION, SOLUTION EPIDURAL; INFILTRATION; INTRACAUDAL; PERINEURAL ONCE AS NEEDED
Status: DISCONTINUED | OUTPATIENT
Start: 2017-01-01 | End: 2017-01-01 | Stop reason: HOSPADM

## 2017-01-01 RX ORDER — LIDOCAINE HYDROCHLORIDE 20 MG/ML
INJECTION, SOLUTION INFILTRATION; PERINEURAL AS NEEDED
Status: DISCONTINUED | OUTPATIENT
Start: 2017-01-01 | End: 2017-01-01 | Stop reason: SURG

## 2017-01-01 RX ORDER — NALBUPHINE HCL 10 MG/ML
2 AMPUL (ML) INJECTION EVERY 4 HOURS PRN
Status: DISCONTINUED | OUTPATIENT
Start: 2017-01-01 | End: 2017-01-01 | Stop reason: HOSPADM

## 2017-01-01 RX ORDER — OXYCODONE HYDROCHLORIDE AND ACETAMINOPHEN 5; 325 MG/1; MG/1
1 TABLET ORAL ONCE AS NEEDED
Status: DISCONTINUED | OUTPATIENT
Start: 2017-01-01 | End: 2017-01-01 | Stop reason: HOSPADM

## 2017-01-01 RX ORDER — SODIUM CHLORIDE 0.9 % (FLUSH) 0.9 %
10 SYRINGE (ML) INJECTION AS NEEDED
Status: DISCONTINUED | OUTPATIENT
Start: 2017-01-01 | End: 2017-01-01 | Stop reason: HOSPADM

## 2017-01-01 RX ORDER — PHYTONADIONE 2 MG/ML
2.5 INJECTION, EMULSION INTRAMUSCULAR; INTRAVENOUS; SUBCUTANEOUS ONCE
Status: COMPLETED | OUTPATIENT
Start: 2017-01-01 | End: 2017-01-01

## 2017-01-01 RX ORDER — SODIUM CHLORIDE, SODIUM LACTATE, POTASSIUM CHLORIDE, CALCIUM CHLORIDE 600; 310; 30; 20 MG/100ML; MG/100ML; MG/100ML; MG/100ML
9 INJECTION, SOLUTION INTRAVENOUS CONTINUOUS
Status: DISCONTINUED | OUTPATIENT
Start: 2017-01-01 | End: 2017-01-01

## 2017-01-01 RX ORDER — FLUMAZENIL 0.1 MG/ML
0.2 INJECTION INTRAVENOUS AS NEEDED
Status: DISCONTINUED | OUTPATIENT
Start: 2017-01-01 | End: 2017-01-01

## 2017-01-01 RX ORDER — FLUTICASONE PROPIONATE 50 MCG
1 SPRAY, SUSPENSION (ML) NASAL DAILY PRN
Status: DISCONTINUED | OUTPATIENT
Start: 2017-01-01 | End: 2017-01-01 | Stop reason: HOSPADM

## 2017-01-01 RX ORDER — LORAZEPAM 2 MG/ML
1 INJECTION INTRAMUSCULAR
Status: DISCONTINUED | OUTPATIENT
Start: 2017-01-01 | End: 2017-01-01 | Stop reason: HOSPADM

## 2017-01-01 RX ORDER — HYDROMORPHONE HYDROCHLORIDE 1 MG/ML
0.5 INJECTION, SOLUTION INTRAMUSCULAR; INTRAVENOUS; SUBCUTANEOUS
Status: DISCONTINUED | OUTPATIENT
Start: 2017-01-01 | End: 2017-01-01

## 2017-01-01 RX ORDER — ACETAMINOPHEN 325 MG/1
650 TABLET ORAL EVERY 4 HOURS PRN
Status: DISCONTINUED | OUTPATIENT
Start: 2017-01-01 | End: 2017-01-01 | Stop reason: HOSPADM

## 2017-01-01 RX ORDER — FENTANYL CITRATE 50 UG/ML
50 INJECTION, SOLUTION INTRAMUSCULAR; INTRAVENOUS
Status: DISCONTINUED | OUTPATIENT
Start: 2017-01-01 | End: 2017-01-01 | Stop reason: HOSPADM

## 2017-01-01 RX ORDER — PROMETHAZINE HYDROCHLORIDE 25 MG/1
25 SUPPOSITORY RECTAL ONCE AS NEEDED
Status: DISCONTINUED | OUTPATIENT
Start: 2017-01-01 | End: 2017-01-01

## 2017-01-01 RX ORDER — GLYCOPYRROLATE 0.2 MG/ML
0.4 INJECTION INTRAMUSCULAR; INTRAVENOUS
Status: DISCONTINUED | OUTPATIENT
Start: 2017-01-01 | End: 2017-01-01 | Stop reason: HOSPADM

## 2017-01-01 RX ORDER — SODIUM CHLORIDE 9 MG/ML
50 INJECTION, SOLUTION INTRAVENOUS CONTINUOUS
Status: DISCONTINUED | OUTPATIENT
Start: 2017-01-01 | End: 2017-01-01 | Stop reason: HOSPADM

## 2017-01-01 RX ORDER — ACETAMINOPHEN 650 MG/1
650 SUPPOSITORY RECTAL EVERY 4 HOURS PRN
Status: DISCONTINUED | OUTPATIENT
Start: 2017-01-01 | End: 2017-01-01 | Stop reason: HOSPADM

## 2017-01-01 RX ORDER — LIDOCAINE 50 MG/G
1 PATCH TOPICAL 2 TIMES DAILY
Status: DISCONTINUED | OUTPATIENT
Start: 2017-01-01 | End: 2017-01-01

## 2017-01-01 RX ORDER — MORPHINE SULFATE 2 MG/ML
1 INJECTION, SOLUTION INTRAMUSCULAR; INTRAVENOUS EVERY 4 HOURS PRN
Status: DISCONTINUED | OUTPATIENT
Start: 2017-01-01 | End: 2017-01-01 | Stop reason: HOSPADM

## 2017-01-01 RX ORDER — HYDRALAZINE HYDROCHLORIDE 20 MG/ML
5 INJECTION INTRAMUSCULAR; INTRAVENOUS
Status: DISCONTINUED | OUTPATIENT
Start: 2017-01-01 | End: 2017-01-01

## 2017-01-01 RX ORDER — ASPIRIN 81 MG/1
81 TABLET ORAL DAILY
Status: DISCONTINUED | OUTPATIENT
Start: 2017-01-01 | End: 2017-01-01 | Stop reason: HOSPADM

## 2017-01-01 RX ORDER — ONDANSETRON 2 MG/ML
INJECTION INTRAMUSCULAR; INTRAVENOUS AS NEEDED
Status: DISCONTINUED | OUTPATIENT
Start: 2017-01-01 | End: 2017-01-01 | Stop reason: SURG

## 2017-01-01 RX ORDER — KETOROLAC TROMETHAMINE 30 MG/ML
INJECTION, SOLUTION INTRAMUSCULAR; INTRAVENOUS AS NEEDED
Status: DISCONTINUED | OUTPATIENT
Start: 2017-01-01 | End: 2017-01-01 | Stop reason: SURG

## 2017-01-01 RX ORDER — MONTELUKAST SODIUM 10 MG/1
10 TABLET ORAL NIGHTLY
Status: DISCONTINUED | OUTPATIENT
Start: 2017-01-01 | End: 2017-01-01 | Stop reason: HOSPADM

## 2017-01-01 RX ORDER — TRANEXAMIC ACID 100 MG/ML
INJECTION, SOLUTION INTRAVENOUS AS NEEDED
Status: DISCONTINUED | OUTPATIENT
Start: 2017-01-01 | End: 2017-01-01 | Stop reason: HOSPADM

## 2017-01-01 RX ORDER — PROMETHAZINE HYDROCHLORIDE 25 MG/1
25 TABLET ORAL ONCE AS NEEDED
Status: DISCONTINUED | OUTPATIENT
Start: 2017-01-01 | End: 2017-01-01

## 2017-01-01 RX ORDER — CARVEDILOL 12.5 MG/1
12.5 TABLET ORAL 2 TIMES DAILY WITH MEALS
Status: DISCONTINUED | OUTPATIENT
Start: 2017-01-01 | End: 2017-01-01 | Stop reason: HOSPADM

## 2017-01-01 RX ORDER — ACETAMINOPHEN 325 MG/1
650 TABLET ORAL EVERY 4 HOURS PRN
Refills: 0
Start: 2017-01-01

## 2017-01-01 RX ORDER — OXYCODONE AND ACETAMINOPHEN 7.5; 325 MG/1; MG/1
1 TABLET ORAL ONCE AS NEEDED
Status: DISCONTINUED | OUTPATIENT
Start: 2017-01-01 | End: 2017-01-01

## 2017-01-01 RX ORDER — LORAZEPAM 2 MG/ML
2 INJECTION INTRAMUSCULAR
Status: DISCONTINUED | OUTPATIENT
Start: 2017-01-01 | End: 2017-01-01 | Stop reason: HOSPADM

## 2017-01-01 RX ORDER — BISACODYL 5 MG/1
10 TABLET, DELAYED RELEASE ORAL DAILY PRN
Status: DISCONTINUED | OUTPATIENT
Start: 2017-01-01 | End: 2017-01-01 | Stop reason: HOSPADM

## 2017-01-01 RX ORDER — LORAZEPAM 2 MG/ML
0.5 CONCENTRATE ORAL
Status: DISCONTINUED | OUTPATIENT
Start: 2017-01-01 | End: 2017-01-01 | Stop reason: HOSPADM

## 2017-01-01 RX ORDER — POTASSIUM CHLORIDE 750 MG/1
10 CAPSULE, EXTENDED RELEASE ORAL DAILY
Status: DISCONTINUED | OUTPATIENT
Start: 2017-01-01 | End: 2017-01-01

## 2017-01-01 RX ORDER — CEFAZOLIN SODIUM 2 G/100ML
2 INJECTION, SOLUTION INTRAVENOUS ONCE
Status: CANCELLED | OUTPATIENT
Start: 2017-01-01 | End: 2017-01-01

## 2017-01-01 RX ORDER — FAMOTIDINE 10 MG/ML
20 INJECTION, SOLUTION INTRAVENOUS ONCE
Status: COMPLETED | OUTPATIENT
Start: 2017-01-01 | End: 2017-01-01

## 2017-01-01 RX ORDER — LIDOCAINE 50 MG/G
1 PATCH TOPICAL
Status: DISCONTINUED | OUTPATIENT
Start: 2017-01-01 | End: 2017-01-01 | Stop reason: HOSPADM

## 2017-01-01 RX ORDER — SODIUM CHLORIDE 0.9 % (FLUSH) 0.9 %
1-10 SYRINGE (ML) INJECTION AS NEEDED
Status: DISCONTINUED | OUTPATIENT
Start: 2017-01-01 | End: 2017-01-01 | Stop reason: HOSPADM

## 2017-01-01 RX ORDER — EPHEDRINE SULFATE 50 MG/ML
5 INJECTION, SOLUTION INTRAVENOUS ONCE AS NEEDED
Status: DISCONTINUED | OUTPATIENT
Start: 2017-01-01 | End: 2017-01-01

## 2017-01-01 RX ORDER — WARFARIN SODIUM 5 MG/1
5.5 TABLET ORAL
COMMUNITY

## 2017-01-01 RX ORDER — ROCURONIUM BROMIDE 10 MG/ML
INJECTION, SOLUTION INTRAVENOUS AS NEEDED
Status: DISCONTINUED | OUTPATIENT
Start: 2017-01-01 | End: 2017-01-01 | Stop reason: SURG

## 2017-01-01 RX ORDER — MORPHINE SULFATE 2 MG/ML
2 INJECTION, SOLUTION INTRAMUSCULAR; INTRAVENOUS
Status: DISCONTINUED | OUTPATIENT
Start: 2017-01-01 | End: 2017-01-01 | Stop reason: HOSPADM

## 2017-01-01 RX ORDER — SODIUM CHLORIDE 9 MG/ML
75 INJECTION, SOLUTION INTRAVENOUS CONTINUOUS
Status: DISCONTINUED | OUTPATIENT
Start: 2017-01-01 | End: 2017-01-01 | Stop reason: HOSPADM

## 2017-01-01 RX ORDER — LEVOTHYROXINE SODIUM 0.07 MG/1
75 TABLET ORAL
Status: DISCONTINUED | OUTPATIENT
Start: 2017-01-01 | End: 2017-01-01 | Stop reason: HOSPADM

## 2017-01-01 RX ORDER — LEVOTHYROXINE SODIUM 0.07 MG/1
125 TABLET ORAL DAILY
COMMUNITY

## 2017-01-01 RX ORDER — ONDANSETRON 2 MG/ML
4 INJECTION INTRAMUSCULAR; INTRAVENOUS ONCE AS NEEDED
Status: DISCONTINUED | OUTPATIENT
Start: 2017-01-01 | End: 2017-01-01

## 2017-01-01 RX ORDER — AMLODIPINE BESYLATE 5 MG/1
5 TABLET ORAL DAILY
Status: DISCONTINUED | OUTPATIENT
Start: 2017-01-01 | End: 2017-01-01 | Stop reason: HOSPADM

## 2017-01-01 RX ORDER — LABETALOL HYDROCHLORIDE 5 MG/ML
5 INJECTION, SOLUTION INTRAVENOUS
Status: DISCONTINUED | OUTPATIENT
Start: 2017-01-01 | End: 2017-01-01

## 2017-01-01 RX ORDER — ONDANSETRON 2 MG/ML
4 INJECTION INTRAMUSCULAR; INTRAVENOUS EVERY 4 HOURS PRN
Status: DISCONTINUED | OUTPATIENT
Start: 2017-01-01 | End: 2017-01-01 | Stop reason: HOSPADM

## 2017-01-01 RX ORDER — ACETAMINOPHEN 160 MG/5ML
650 SOLUTION ORAL EVERY 4 HOURS PRN
Status: DISCONTINUED | OUTPATIENT
Start: 2017-01-01 | End: 2017-01-01 | Stop reason: HOSPADM

## 2017-01-01 RX ORDER — PROMETHAZINE HYDROCHLORIDE 25 MG/1
12.5 TABLET ORAL ONCE AS NEEDED
Status: DISCONTINUED | OUTPATIENT
Start: 2017-01-01 | End: 2017-01-01

## 2017-01-01 RX ORDER — ASPIRIN 81 MG/1
81 TABLET ORAL DAILY
COMMUNITY

## 2017-01-01 RX ORDER — MORPHINE SULFATE 100 MG/5ML
20 SOLUTION ORAL
Status: DISCONTINUED | OUTPATIENT
Start: 2017-01-01 | End: 2017-01-01 | Stop reason: HOSPADM

## 2017-01-01 RX ORDER — MONTELUKAST SODIUM 10 MG/1
10 TABLET ORAL NIGHTLY
COMMUNITY

## 2017-01-01 RX ORDER — SODIUM CHLORIDE, SODIUM LACTATE, POTASSIUM CHLORIDE, CALCIUM CHLORIDE 600; 310; 30; 20 MG/100ML; MG/100ML; MG/100ML; MG/100ML
9 INJECTION, SOLUTION INTRAVENOUS CONTINUOUS
Status: DISCONTINUED | OUTPATIENT
Start: 2017-01-01 | End: 2017-01-01 | Stop reason: HOSPADM

## 2017-01-01 RX ORDER — ATORVASTATIN CALCIUM 80 MG/1
80 TABLET, FILM COATED ORAL DAILY
COMMUNITY

## 2017-01-01 RX ORDER — MIDAZOLAM HYDROCHLORIDE 1 MG/ML
2 INJECTION INTRAMUSCULAR; INTRAVENOUS
Status: DISCONTINUED | OUTPATIENT
Start: 2017-01-01 | End: 2017-01-01 | Stop reason: HOSPADM

## 2017-01-01 RX ORDER — ATORVASTATIN CALCIUM 80 MG/1
80 TABLET, FILM COATED ORAL DAILY
Status: DISCONTINUED | OUTPATIENT
Start: 2017-01-01 | End: 2017-01-01 | Stop reason: HOSPADM

## 2017-01-01 RX ORDER — MIDAZOLAM HYDROCHLORIDE 1 MG/ML
1 INJECTION INTRAMUSCULAR; INTRAVENOUS
Status: DISCONTINUED | OUTPATIENT
Start: 2017-01-01 | End: 2017-01-01 | Stop reason: HOSPADM

## 2017-01-01 RX ORDER — LORAZEPAM 2 MG/ML
2 CONCENTRATE ORAL
Status: DISCONTINUED | OUTPATIENT
Start: 2017-01-01 | End: 2017-01-01 | Stop reason: HOSPADM

## 2017-01-01 RX ORDER — GABAPENTIN 100 MG/1
100 CAPSULE ORAL DAILY
Status: DISCONTINUED | OUTPATIENT
Start: 2017-01-01 | End: 2017-01-01 | Stop reason: HOSPADM

## 2017-01-01 RX ORDER — PHYTONADIONE 2 MG/ML
5 INJECTION, EMULSION INTRAMUSCULAR; INTRAVENOUS; SUBCUTANEOUS ONCE
Status: COMPLETED | OUTPATIENT
Start: 2017-01-01 | End: 2017-01-01

## 2017-01-01 RX ORDER — DIPHENHYDRAMINE HYDROCHLORIDE 50 MG/ML
12.5 INJECTION INTRAMUSCULAR; INTRAVENOUS
Status: DISCONTINUED | OUTPATIENT
Start: 2017-01-01 | End: 2017-01-01

## 2017-01-01 RX ORDER — WARFARIN SODIUM 5 MG/1
5 TABLET ORAL
Status: DISCONTINUED | OUTPATIENT
Start: 2017-01-01 | End: 2017-01-01 | Stop reason: HOSPADM

## 2017-01-01 RX ORDER — ONDANSETRON 2 MG/ML
4 INJECTION INTRAMUSCULAR; INTRAVENOUS ONCE
Status: COMPLETED | OUTPATIENT
Start: 2017-01-01 | End: 2017-01-01

## 2017-01-01 RX ORDER — MORPHINE SULFATE 2 MG/ML
2 INJECTION, SOLUTION INTRAMUSCULAR; INTRAVENOUS EVERY 4 HOURS PRN
Status: DISCONTINUED | OUTPATIENT
Start: 2017-01-01 | End: 2017-01-01 | Stop reason: HOSPADM

## 2017-01-01 RX ORDER — PROMETHAZINE HYDROCHLORIDE 25 MG/ML
12.5 INJECTION, SOLUTION INTRAMUSCULAR; INTRAVENOUS ONCE AS NEEDED
Status: DISCONTINUED | OUTPATIENT
Start: 2017-01-01 | End: 2017-01-01

## 2017-01-01 RX ORDER — LEVOTHYROXINE SODIUM 0.12 MG/1
125 TABLET ORAL DAILY
Status: DISCONTINUED | OUTPATIENT
Start: 2017-01-01 | End: 2017-01-01 | Stop reason: HOSPADM

## 2017-01-01 RX ORDER — MORPHINE SULFATE 100 MG/5ML
10 SOLUTION ORAL
Status: DISCONTINUED | OUTPATIENT
Start: 2017-01-01 | End: 2017-01-01 | Stop reason: HOSPADM

## 2017-01-01 RX ORDER — BIOTIN 10 MG
5 TABLET ORAL DAILY
COMMUNITY

## 2017-01-01 RX ORDER — GLYCOPYRROLATE 0.2 MG/ML
0.1 INJECTION INTRAMUSCULAR; INTRAVENOUS ONCE
Status: DISCONTINUED | OUTPATIENT
Start: 2017-01-01 | End: 2017-01-01 | Stop reason: HOSPADM

## 2017-01-01 RX ORDER — PROPOFOL 10 MG/ML
VIAL (ML) INTRAVENOUS AS NEEDED
Status: DISCONTINUED | OUTPATIENT
Start: 2017-01-01 | End: 2017-01-01 | Stop reason: SURG

## 2017-01-01 RX ORDER — FLUTICASONE PROPIONATE 50 MCG
1 SPRAY, SUSPENSION (ML) NASAL DAILY PRN
COMMUNITY

## 2017-01-01 RX ORDER — LORAZEPAM 2 MG/ML
1 CONCENTRATE ORAL
Status: DISCONTINUED | OUTPATIENT
Start: 2017-01-01 | End: 2017-01-01 | Stop reason: HOSPADM

## 2017-01-01 RX ORDER — LIDOCAINE 50 MG/G
1 PATCH TOPICAL 2 TIMES DAILY
COMMUNITY

## 2017-01-01 RX ORDER — PANTOPRAZOLE SODIUM 40 MG/1
40 TABLET, DELAYED RELEASE ORAL
Status: DISCONTINUED | OUTPATIENT
Start: 2017-01-01 | End: 2017-01-01 | Stop reason: HOSPADM

## 2017-01-01 RX ORDER — FOLIC ACID 1 MG/1
1 TABLET ORAL DAILY
Status: DISCONTINUED | OUTPATIENT
Start: 2017-01-01 | End: 2017-01-01 | Stop reason: HOSPADM

## 2017-01-01 RX ORDER — LANOLIN ALCOHOL/MO/W.PET/CERES
1000 CREAM (GRAM) TOPICAL DAILY
COMMUNITY

## 2017-01-01 RX ORDER — LORAZEPAM 1 MG/1
1 TABLET ORAL
Status: DISCONTINUED | OUTPATIENT
Start: 2017-01-01 | End: 2017-01-01 | Stop reason: HOSPADM

## 2017-01-01 RX ORDER — MORPHINE SULFATE 10 MG/ML
6 INJECTION INTRAMUSCULAR; INTRAVENOUS; SUBCUTANEOUS
Status: DISCONTINUED | OUTPATIENT
Start: 2017-01-01 | End: 2017-01-01 | Stop reason: HOSPADM

## 2017-01-01 RX ORDER — PANTOPRAZOLE SODIUM 40 MG/1
40 TABLET, DELAYED RELEASE ORAL DAILY
Status: DISCONTINUED | OUTPATIENT
Start: 2017-01-01 | End: 2017-01-01 | Stop reason: HOSPADM

## 2017-01-01 RX ORDER — AMLODIPINE BESYLATE 5 MG/1
5 TABLET ORAL DAILY
COMMUNITY

## 2017-01-01 RX ORDER — MORPHINE SULFATE 2 MG/ML
2 INJECTION, SOLUTION INTRAMUSCULAR; INTRAVENOUS ONCE
Status: COMPLETED | OUTPATIENT
Start: 2017-01-01 | End: 2017-01-01

## 2017-01-01 RX ORDER — NALOXONE HCL 0.4 MG/ML
0.2 VIAL (ML) INJECTION AS NEEDED
Status: DISCONTINUED | OUTPATIENT
Start: 2017-01-01 | End: 2017-01-01

## 2017-01-01 RX ORDER — HYDROCODONE BITARTRATE AND ACETAMINOPHEN 5; 325 MG/1; MG/1
1 TABLET ORAL EVERY 4 HOURS PRN
Status: DISCONTINUED | OUTPATIENT
Start: 2017-01-01 | End: 2017-01-01

## 2017-01-01 RX ORDER — GLYCOPYRROLATE 0.2 MG/ML
0.2 INJECTION INTRAMUSCULAR; INTRAVENOUS
Status: DISCONTINUED | OUTPATIENT
Start: 2017-01-01 | End: 2017-01-01 | Stop reason: HOSPADM

## 2017-01-01 RX ORDER — GABAPENTIN 100 MG/1
100 CAPSULE ORAL DAILY
COMMUNITY

## 2017-01-01 RX ORDER — LORAZEPAM 1 MG/1
2 TABLET ORAL
Status: DISCONTINUED | OUTPATIENT
Start: 2017-01-01 | End: 2017-01-01 | Stop reason: HOSPADM

## 2017-01-01 RX ORDER — SUCCINYLCHOLINE CHLORIDE 20 MG/ML
INJECTION INTRAMUSCULAR; INTRAVENOUS AS NEEDED
Status: DISCONTINUED | OUTPATIENT
Start: 2017-01-01 | End: 2017-01-01 | Stop reason: SURG

## 2017-01-01 RX ORDER — PROMETHAZINE HYDROCHLORIDE 25 MG/1
25 TABLET ORAL ONCE AS NEEDED
Status: DISCONTINUED | OUTPATIENT
Start: 2017-01-01 | End: 2017-01-01 | Stop reason: HOSPADM

## 2017-01-01 RX ORDER — PROMETHAZINE HYDROCHLORIDE 25 MG/1
25 SUPPOSITORY RECTAL ONCE AS NEEDED
Status: DISCONTINUED | OUTPATIENT
Start: 2017-01-01 | End: 2017-01-01 | Stop reason: HOSPADM

## 2017-01-01 RX ORDER — LORAZEPAM 0.5 MG/1
0.5 TABLET ORAL
Status: DISCONTINUED | OUTPATIENT
Start: 2017-01-01 | End: 2017-01-01 | Stop reason: HOSPADM

## 2017-01-01 RX ORDER — MAGNESIUM HYDROXIDE/ALUMINUM HYDROXICE/SIMETHICONE 120; 1200; 1200 MG/30ML; MG/30ML; MG/30ML
15 SUSPENSION ORAL EVERY 6 HOURS PRN
COMMUNITY

## 2017-01-01 RX ORDER — ONDANSETRON HYDROCHLORIDE 4 MG/5ML
4 SOLUTION ORAL EVERY 8 HOURS PRN
COMMUNITY

## 2017-01-01 RX ORDER — LORAZEPAM 2 MG/ML
0.5 INJECTION INTRAMUSCULAR
Status: DISCONTINUED | OUTPATIENT
Start: 2017-01-01 | End: 2017-01-01 | Stop reason: HOSPADM

## 2017-01-01 RX ORDER — CHOLECALCIFEROL (VITAMIN D3) 125 MCG
1000 CAPSULE ORAL DAILY
Status: DISCONTINUED | OUTPATIENT
Start: 2017-01-01 | End: 2017-01-01 | Stop reason: HOSPADM

## 2017-01-01 RX ORDER — FOLIC ACID 1 MG/1
1 TABLET ORAL DAILY
COMMUNITY

## 2017-01-01 RX ORDER — LORAZEPAM 2 MG/ML
0.5 INJECTION INTRAMUSCULAR EVERY 4 HOURS PRN
Status: DISCONTINUED | OUTPATIENT
Start: 2017-01-01 | End: 2017-01-01 | Stop reason: HOSPADM

## 2017-01-01 RX ORDER — BISACODYL 5 MG/1
5 TABLET, DELAYED RELEASE ORAL DAILY PRN
COMMUNITY

## 2017-01-01 RX ORDER — MORPHINE SULFATE 100 MG/5ML
5 SOLUTION ORAL
Status: DISCONTINUED | OUTPATIENT
Start: 2017-01-01 | End: 2017-01-01 | Stop reason: HOSPADM

## 2017-01-01 RX ORDER — NALOXONE HCL 0.4 MG/ML
0.4 VIAL (ML) INJECTION AS NEEDED
Status: DISCONTINUED | OUTPATIENT
Start: 2017-01-01 | End: 2017-01-01 | Stop reason: HOSPADM

## 2017-01-01 RX ORDER — PROMETHAZINE HYDROCHLORIDE 25 MG/ML
6.25 INJECTION, SOLUTION INTRAMUSCULAR; INTRAVENOUS ONCE AS NEEDED
Status: DISCONTINUED | OUTPATIENT
Start: 2017-01-01 | End: 2017-01-01 | Stop reason: HOSPADM

## 2017-01-01 RX ORDER — NALBUPHINE HCL 10 MG/ML
10 AMPUL (ML) INJECTION EVERY 4 HOURS PRN
Status: DISCONTINUED | OUTPATIENT
Start: 2017-01-01 | End: 2017-01-01 | Stop reason: HOSPADM

## 2017-01-01 RX ORDER — ACETAMINOPHEN 325 MG/1
650 TABLET ORAL EVERY 4 HOURS PRN
Status: DISCONTINUED | OUTPATIENT
Start: 2017-01-01 | End: 2017-01-01 | Stop reason: SDUPTHER

## 2017-01-01 RX ORDER — DOCUSATE SODIUM 100 MG/1
100 CAPSULE, LIQUID FILLED ORAL 2 TIMES DAILY PRN
Status: DISCONTINUED | OUTPATIENT
Start: 2017-01-01 | End: 2017-01-01 | Stop reason: HOSPADM

## 2017-01-01 RX ORDER — NALOXONE HCL 0.4 MG/ML
0.4 VIAL (ML) INJECTION
Status: DISCONTINUED | OUTPATIENT
Start: 2017-01-01 | End: 2017-01-01 | Stop reason: HOSPADM

## 2017-01-01 RX ORDER — GLYCOPYRROLATE 0.2 MG/ML
INJECTION INTRAMUSCULAR; INTRAVENOUS AS NEEDED
Status: DISCONTINUED | OUTPATIENT
Start: 2017-01-01 | End: 2017-01-01 | Stop reason: SURG

## 2017-01-01 RX ORDER — HYDRALAZINE HYDROCHLORIDE 20 MG/ML
5 INJECTION INTRAMUSCULAR; INTRAVENOUS
Status: DISCONTINUED | OUTPATIENT
Start: 2017-01-01 | End: 2017-01-01 | Stop reason: HOSPADM

## 2017-01-01 RX ORDER — CARVEDILOL 12.5 MG/1
12.5 TABLET ORAL 2 TIMES DAILY WITH MEALS
COMMUNITY

## 2017-01-01 RX ORDER — ONDANSETRON 2 MG/ML
4 INJECTION INTRAMUSCULAR; INTRAVENOUS EVERY 6 HOURS PRN
Status: DISCONTINUED | OUTPATIENT
Start: 2017-01-01 | End: 2017-01-01 | Stop reason: HOSPADM

## 2017-01-01 RX ORDER — DIPHENHYDRAMINE HYDROCHLORIDE 50 MG/ML
12.5 INJECTION INTRAMUSCULAR; INTRAVENOUS
Status: DISCONTINUED | OUTPATIENT
Start: 2017-01-01 | End: 2017-01-01 | Stop reason: HOSPADM

## 2017-01-01 RX ORDER — FENTANYL CITRATE 50 UG/ML
50 INJECTION, SOLUTION INTRAMUSCULAR; INTRAVENOUS
Status: DISCONTINUED | OUTPATIENT
Start: 2017-01-01 | End: 2017-01-01

## 2017-01-01 RX ORDER — DIPHENOXYLATE HYDROCHLORIDE AND ATROPINE SULFATE 2.5; .025 MG/1; MG/1
1 TABLET ORAL
Status: DISCONTINUED | OUTPATIENT
Start: 2017-01-01 | End: 2017-01-01 | Stop reason: HOSPADM

## 2017-01-01 RX ORDER — PANTOPRAZOLE SODIUM 40 MG/1
40 TABLET, DELAYED RELEASE ORAL DAILY
COMMUNITY

## 2017-01-01 RX ORDER — POTASSIUM CHLORIDE 1500 MG/1
20 TABLET, FILM COATED, EXTENDED RELEASE ORAL 2 TIMES DAILY
COMMUNITY

## 2017-01-01 RX ORDER — HYDROCODONE BITARTRATE AND ACETAMINOPHEN 7.5; 325 MG/1; MG/1
1 TABLET ORAL ONCE AS NEEDED
Status: DISCONTINUED | OUTPATIENT
Start: 2017-01-01 | End: 2017-01-01

## 2017-01-01 RX ADMIN — PANTOPRAZOLE SODIUM 40 MG: 40 TABLET, DELAYED RELEASE ORAL at 05:26

## 2017-01-01 RX ADMIN — PANTOPRAZOLE SODIUM 40 MG: 40 TABLET, DELAYED RELEASE ORAL at 08:56

## 2017-01-01 RX ADMIN — PANTOPRAZOLE SODIUM 40 MG: 40 TABLET, DELAYED RELEASE ORAL at 06:04

## 2017-01-01 RX ADMIN — LEVOTHYROXINE SODIUM 125 MCG: 125 TABLET ORAL at 08:19

## 2017-01-01 RX ADMIN — LIDOCAINE 1 PATCH: 50 PATCH CUTANEOUS at 18:56

## 2017-01-01 RX ADMIN — SERTRALINE 50 MG: 50 TABLET, FILM COATED ORAL at 08:31

## 2017-01-01 RX ADMIN — ACETAMINOPHEN 650 MG: 325 TABLET ORAL at 02:26

## 2017-01-01 RX ADMIN — LIDOCAINE 1 PATCH: 50 PATCH CUTANEOUS at 08:21

## 2017-01-01 RX ADMIN — CARVEDILOL 12.5 MG: 12.5 TABLET, FILM COATED ORAL at 18:27

## 2017-01-01 RX ADMIN — FAMOTIDINE 20 MG: 10 INJECTION INTRAVENOUS at 13:32

## 2017-01-01 RX ADMIN — MORPHINE SULFATE 2 MG: 2 INJECTION, SOLUTION INTRAMUSCULAR; INTRAVENOUS at 09:17

## 2017-01-01 RX ADMIN — AMLODIPINE BESYLATE 5 MG: 5 TABLET ORAL at 09:36

## 2017-01-01 RX ADMIN — SODIUM CHLORIDE, POTASSIUM CHLORIDE, SODIUM LACTATE AND CALCIUM CHLORIDE 9 ML/HR: 600; 310; 30; 20 INJECTION, SOLUTION INTRAVENOUS at 13:32

## 2017-01-01 RX ADMIN — GABAPENTIN 100 MG: 100 CAPSULE ORAL at 09:07

## 2017-01-01 RX ADMIN — CARVEDILOL 12.5 MG: 12.5 TABLET, FILM COATED ORAL at 08:19

## 2017-01-01 RX ADMIN — CARVEDILOL 12.5 MG: 12.5 TABLET, FILM COATED ORAL at 17:30

## 2017-01-01 RX ADMIN — MORPHINE SULFATE 1 MG: 2 INJECTION, SOLUTION INTRAMUSCULAR; INTRAVENOUS at 14:51

## 2017-01-01 RX ADMIN — LEVOTHYROXINE SODIUM 125 MCG: 125 TABLET ORAL at 08:31

## 2017-01-01 RX ADMIN — HYDROCODONE BITARTRATE AND ACETAMINOPHEN 1 TABLET: 5; 325 TABLET ORAL at 21:43

## 2017-01-01 RX ADMIN — KETOROLAC TROMETHAMINE 30 MG: 30 INJECTION, SOLUTION INTRAMUSCULAR; INTRAVENOUS at 15:03

## 2017-01-01 RX ADMIN — AMLODIPINE BESYLATE 5 MG: 5 TABLET ORAL at 08:56

## 2017-01-01 RX ADMIN — CARVEDILOL 12.5 MG: 12.5 TABLET, FILM COATED ORAL at 18:03

## 2017-01-01 RX ADMIN — ATORVASTATIN CALCIUM 80 MG: 80 TABLET, FILM COATED ORAL at 08:33

## 2017-01-01 RX ADMIN — LORAZEPAM 1 MG: 2 INJECTION INTRAMUSCULAR; INTRAVENOUS at 08:14

## 2017-01-01 RX ADMIN — CARVEDILOL 12.5 MG: 12.5 TABLET, FILM COATED ORAL at 21:25

## 2017-01-01 RX ADMIN — AMLODIPINE BESYLATE 5 MG: 5 TABLET ORAL at 08:31

## 2017-01-01 RX ADMIN — MORPHINE SULFATE 2 MG: 2 INJECTION, SOLUTION INTRAMUSCULAR; INTRAVENOUS at 16:45

## 2017-01-01 RX ADMIN — FOLIC ACID 1 MG: 1 TABLET ORAL at 08:56

## 2017-01-01 RX ADMIN — LORAZEPAM 1 MG: 2 INJECTION INTRAMUSCULAR; INTRAVENOUS at 16:45

## 2017-01-01 RX ADMIN — METOPROLOL TARTRATE 25 MG: 25 TABLET ORAL at 00:22

## 2017-01-01 RX ADMIN — LORAZEPAM 1 MG: 2 INJECTION INTRAMUSCULAR; INTRAVENOUS at 21:01

## 2017-01-01 RX ADMIN — MONTELUKAST 10 MG: 10 TABLET, FILM COATED ORAL at 20:39

## 2017-01-01 RX ADMIN — ASPIRIN 81 MG: 81 TABLET ORAL at 09:23

## 2017-01-01 RX ADMIN — SODIUM CHLORIDE 50 ML/HR: 9 INJECTION, SOLUTION INTRAVENOUS at 03:03

## 2017-01-01 RX ADMIN — ASPIRIN 81 MG: 81 TABLET ORAL at 08:31

## 2017-01-01 RX ADMIN — LIDOCAINE 1 PATCH: 50 PATCH CUTANEOUS at 08:31

## 2017-01-01 RX ADMIN — METOPROLOL TARTRATE 25 MG: 25 TABLET ORAL at 18:15

## 2017-01-01 RX ADMIN — ACETAMINOPHEN 650 MG: 325 TABLET ORAL at 09:37

## 2017-01-01 RX ADMIN — ASPIRIN 81 MG: 81 TABLET ORAL at 09:08

## 2017-01-01 RX ADMIN — CARVEDILOL 12.5 MG: 12.5 TABLET, FILM COATED ORAL at 20:39

## 2017-01-01 RX ADMIN — SERTRALINE 50 MG: 50 TABLET, FILM COATED ORAL at 08:19

## 2017-01-01 RX ADMIN — ATORVASTATIN CALCIUM 80 MG: 80 TABLET, FILM COATED ORAL at 09:36

## 2017-01-01 RX ADMIN — CARVEDILOL 12.5 MG: 12.5 TABLET, FILM COATED ORAL at 09:08

## 2017-01-01 RX ADMIN — MORPHINE SULFATE 1 MG: 2 INJECTION, SOLUTION INTRAMUSCULAR; INTRAVENOUS at 07:16

## 2017-01-01 RX ADMIN — ONDANSETRON 4 MG: 2 INJECTION INTRAMUSCULAR; INTRAVENOUS at 15:10

## 2017-01-01 RX ADMIN — GABAPENTIN 100 MG: 100 CAPSULE ORAL at 09:36

## 2017-01-01 RX ADMIN — PANTOPRAZOLE SODIUM 40 MG: 40 TABLET, DELAYED RELEASE ORAL at 08:19

## 2017-01-01 RX ADMIN — CYANOCOBALAMIN TAB 500 MCG 1000 MCG: 500 TAB at 09:07

## 2017-01-01 RX ADMIN — MORPHINE SULFATE 2 MG: 2 INJECTION, SOLUTION INTRAMUSCULAR; INTRAVENOUS at 00:21

## 2017-01-01 RX ADMIN — LEVOTHYROXINE SODIUM 75 MCG: 75 TABLET ORAL at 05:26

## 2017-01-01 RX ADMIN — ROCURONIUM BROMIDE 25 MG: 10 INJECTION INTRAVENOUS at 13:55

## 2017-01-01 RX ADMIN — MORPHINE SULFATE 1 MG: 2 INJECTION, SOLUTION INTRAMUSCULAR; INTRAVENOUS at 10:36

## 2017-01-01 RX ADMIN — LORAZEPAM 1 MG: 2 INJECTION INTRAMUSCULAR; INTRAVENOUS at 13:04

## 2017-01-01 RX ADMIN — CARVEDILOL 12.5 MG: 12.5 TABLET, FILM COATED ORAL at 09:24

## 2017-01-01 RX ADMIN — SODIUM CHLORIDE, POTASSIUM CHLORIDE, SODIUM LACTATE AND CALCIUM CHLORIDE: 600; 310; 30; 20 INJECTION, SOLUTION INTRAVENOUS at 16:47

## 2017-01-01 RX ADMIN — PANTOPRAZOLE SODIUM 40 MG: 40 TABLET, DELAYED RELEASE ORAL at 05:18

## 2017-01-01 RX ADMIN — ASPIRIN 81 MG: 81 TABLET ORAL at 08:19

## 2017-01-01 RX ADMIN — SODIUM CHLORIDE 75 ML/HR: 9 INJECTION, SOLUTION INTRAVENOUS at 07:45

## 2017-01-01 RX ADMIN — MORPHINE SULFATE 1 MG: 2 INJECTION, SOLUTION INTRAMUSCULAR; INTRAVENOUS at 22:24

## 2017-01-01 RX ADMIN — LIDOCAINE 1 PATCH: 50 PATCH CUTANEOUS at 17:58

## 2017-01-01 RX ADMIN — LIDOCAINE 1 PATCH: 50 PATCH CUTANEOUS at 08:55

## 2017-01-01 RX ADMIN — MONTELUKAST 10 MG: 10 TABLET, FILM COATED ORAL at 20:53

## 2017-01-01 RX ADMIN — GLYCOPYRROLATE 0.2 MG: 0.2 INJECTION, SOLUTION INTRAMUSCULAR; INTRAVENOUS at 21:02

## 2017-01-01 RX ADMIN — PHENYLEPHRINE HYDROCHLORIDE 100 MCG: 10 INJECTION INTRAVENOUS at 17:05

## 2017-01-01 RX ADMIN — METOPROLOL TARTRATE 25 MG: 25 TABLET ORAL at 08:33

## 2017-01-01 RX ADMIN — METOPROLOL TARTRATE 25 MG: 25 TABLET ORAL at 20:39

## 2017-01-01 RX ADMIN — LEVOTHYROXINE SODIUM 75 MCG: 75 TABLET ORAL at 06:02

## 2017-01-01 RX ADMIN — CARVEDILOL 12.5 MG: 12.5 TABLET, FILM COATED ORAL at 08:56

## 2017-01-01 RX ADMIN — ATORVASTATIN CALCIUM 80 MG: 80 TABLET, FILM COATED ORAL at 09:50

## 2017-01-01 RX ADMIN — POTASSIUM CHLORIDE 10 MEQ: 750 CAPSULE, EXTENDED RELEASE ORAL at 18:27

## 2017-01-01 RX ADMIN — PHYTONADIONE 2.5 MG: 1 INJECTION, EMULSION INTRAMUSCULAR; INTRAVENOUS; SUBCUTANEOUS at 10:24

## 2017-01-01 RX ADMIN — PANTOPRAZOLE SODIUM 40 MG: 40 TABLET, DELAYED RELEASE ORAL at 08:33

## 2017-01-01 RX ADMIN — PHENYLEPHRINE HYDROCHLORIDE 100 MCG: 10 INJECTION INTRAVENOUS at 16:55

## 2017-01-01 RX ADMIN — MORPHINE SULFATE 1 MG: 2 INJECTION, SOLUTION INTRAMUSCULAR; INTRAVENOUS at 04:32

## 2017-01-01 RX ADMIN — ENOXAPARIN SODIUM 30 MG: 30 INJECTION SUBCUTANEOUS at 12:23

## 2017-01-01 RX ADMIN — CARVEDILOL 12.5 MG: 12.5 TABLET, FILM COATED ORAL at 08:31

## 2017-01-01 RX ADMIN — HYDROCODONE BITARTRATE AND ACETAMINOPHEN 1 TABLET: 5; 325 TABLET ORAL at 21:40

## 2017-01-01 RX ADMIN — HYDROCODONE BITARTRATE AND ACETAMINOPHEN 1 TABLET: 5; 325 TABLET ORAL at 05:24

## 2017-01-01 RX ADMIN — CARVEDILOL 12.5 MG: 12.5 TABLET, FILM COATED ORAL at 18:15

## 2017-01-01 RX ADMIN — AMLODIPINE BESYLATE 5 MG: 5 TABLET ORAL at 08:33

## 2017-01-01 RX ADMIN — MORPHINE SULFATE 2 MG: 2 INJECTION, SOLUTION INTRAMUSCULAR; INTRAVENOUS at 00:55

## 2017-01-01 RX ADMIN — HYDROCODONE BITARTRATE AND ACETAMINOPHEN 1 TABLET: 5; 325 TABLET ORAL at 04:18

## 2017-01-01 RX ADMIN — CARVEDILOL 12.5 MG: 12.5 TABLET, FILM COATED ORAL at 09:51

## 2017-01-01 RX ADMIN — ATORVASTATIN CALCIUM 80 MG: 80 TABLET, FILM COATED ORAL at 08:56

## 2017-01-01 RX ADMIN — MONTELUKAST 10 MG: 10 TABLET, FILM COATED ORAL at 03:58

## 2017-01-01 RX ADMIN — NEOSTIGMINE METHYLSULFATE 1.5 MG: 1 INJECTION INTRAMUSCULAR; INTRAVENOUS; SUBCUTANEOUS at 15:01

## 2017-01-01 RX ADMIN — MIDAZOLAM 0.5 MG: 1 INJECTION INTRAMUSCULAR; INTRAVENOUS at 13:32

## 2017-01-01 RX ADMIN — FENTANYL CITRATE 25 MCG: 50 INJECTION INTRAMUSCULAR; INTRAVENOUS at 13:32

## 2017-01-01 RX ADMIN — EPHEDRINE SULFATE 10 MG: 50 INJECTION INTRAMUSCULAR; INTRAVENOUS; SUBCUTANEOUS at 15:05

## 2017-01-01 RX ADMIN — POTASSIUM CHLORIDE 10 MEQ: 750 CAPSULE, EXTENDED RELEASE ORAL at 08:33

## 2017-01-01 RX ADMIN — MONTELUKAST 10 MG: 10 TABLET, FILM COATED ORAL at 21:26

## 2017-01-01 RX ADMIN — MONTELUKAST 10 MG: 10 TABLET, FILM COATED ORAL at 20:17

## 2017-01-01 RX ADMIN — CEFAZOLIN SODIUM 1 G: 1 INJECTION, SOLUTION INTRAVENOUS at 21:41

## 2017-01-01 RX ADMIN — LEVOTHYROXINE SODIUM 75 MCG: 75 TABLET ORAL at 05:18

## 2017-01-01 RX ADMIN — LEVOTHYROXINE SODIUM 75 MCG: 75 TABLET ORAL at 08:32

## 2017-01-01 RX ADMIN — CYANOCOBALAMIN TAB 500 MCG 1000 MCG: 500 TAB at 08:56

## 2017-01-01 RX ADMIN — CYANOCOBALAMIN TAB 500 MCG 1000 MCG: 500 TAB at 08:31

## 2017-01-01 RX ADMIN — ACETAMINOPHEN 650 MG: 325 TABLET ORAL at 22:42

## 2017-01-01 RX ADMIN — GLYCOPYRROLATE 0.2 MG: 0.2 INJECTION INTRAMUSCULAR; INTRAVENOUS at 15:01

## 2017-01-01 RX ADMIN — LEVOTHYROXINE SODIUM 125 MCG: 125 TABLET ORAL at 08:56

## 2017-01-01 RX ADMIN — AMLODIPINE BESYLATE 5 MG: 5 TABLET ORAL at 09:23

## 2017-01-01 RX ADMIN — GLYCOPYRROLATE 0.4 MG: 0.2 INJECTION, SOLUTION INTRAMUSCULAR; INTRAVENOUS at 23:05

## 2017-01-01 RX ADMIN — FENTANYL CITRATE 50 MCG: 50 INJECTION INTRAMUSCULAR; INTRAVENOUS at 13:55

## 2017-01-01 RX ADMIN — HYDROCODONE BITARTRATE AND ACETAMINOPHEN 1 TABLET: 5; 325 TABLET ORAL at 18:15

## 2017-01-01 RX ADMIN — CARVEDILOL 12.5 MG: 12.5 TABLET, FILM COATED ORAL at 13:11

## 2017-01-01 RX ADMIN — LIDOCAINE 1 PATCH: 50 PATCH CUTANEOUS at 09:49

## 2017-01-01 RX ADMIN — LORAZEPAM 1 MG: 2 INJECTION INTRAMUSCULAR; INTRAVENOUS at 00:55

## 2017-01-01 RX ADMIN — ASPIRIN 81 MG: 81 TABLET ORAL at 08:56

## 2017-01-01 RX ADMIN — MORPHINE SULFATE 1 MG: 2 INJECTION, SOLUTION INTRAMUSCULAR; INTRAVENOUS at 09:28

## 2017-01-01 RX ADMIN — ATORVASTATIN CALCIUM 80 MG: 80 TABLET, FILM COATED ORAL at 08:31

## 2017-01-01 RX ADMIN — PHYTONADIONE 5 MG: 1 INJECTION, EMULSION INTRAMUSCULAR; INTRAVENOUS; SUBCUTANEOUS at 22:00

## 2017-01-01 RX ADMIN — GABAPENTIN 100 MG: 100 CAPSULE ORAL at 08:31

## 2017-01-01 RX ADMIN — ASPIRIN 81 MG: 81 TABLET ORAL at 09:50

## 2017-01-01 RX ADMIN — ASPIRIN 81 MG: 81 TABLET ORAL at 18:26

## 2017-01-01 RX ADMIN — ONDANSETRON 4 MG: 2 INJECTION INTRAMUSCULAR; INTRAVENOUS at 20:15

## 2017-01-01 RX ADMIN — MORPHINE SULFATE 2 MG: 2 INJECTION, SOLUTION INTRAMUSCULAR; INTRAVENOUS at 21:01

## 2017-01-01 RX ADMIN — FOLIC ACID 1 MG: 1 TABLET ORAL at 09:08

## 2017-01-01 RX ADMIN — CARVEDILOL 12.5 MG: 12.5 TABLET, FILM COATED ORAL at 09:36

## 2017-01-01 RX ADMIN — ACETAMINOPHEN 650 MG: 325 TABLET ORAL at 15:34

## 2017-01-01 RX ADMIN — ATORVASTATIN CALCIUM 80 MG: 80 TABLET, FILM COATED ORAL at 09:24

## 2017-01-01 RX ADMIN — ACETAMINOPHEN 650 MG: 325 TABLET ORAL at 03:48

## 2017-01-01 RX ADMIN — AMLODIPINE BESYLATE 5 MG: 5 TABLET ORAL at 09:08

## 2017-01-01 RX ADMIN — LEVOTHYROXINE SODIUM 125 MCG: 125 TABLET ORAL at 09:08

## 2017-01-01 RX ADMIN — GABAPENTIN 100 MG: 100 CAPSULE ORAL at 08:34

## 2017-01-01 RX ADMIN — LIDOCAINE HYDROCHLORIDE 50 MG: 20 INJECTION, SOLUTION INFILTRATION; PERINEURAL at 13:55

## 2017-01-01 RX ADMIN — GABAPENTIN 100 MG: 100 CAPSULE ORAL at 08:55

## 2017-01-01 RX ADMIN — MORPHINE SULFATE 2 MG: 2 INJECTION, SOLUTION INTRAMUSCULAR; INTRAVENOUS at 04:42

## 2017-01-01 RX ADMIN — CARVEDILOL 12.5 MG: 12.5 TABLET, FILM COATED ORAL at 08:33

## 2017-01-01 RX ADMIN — ACETAMINOPHEN 650 MG: 325 TABLET ORAL at 19:44

## 2017-01-01 RX ADMIN — FOLIC ACID 1 MG: 1 TABLET ORAL at 08:31

## 2017-01-01 RX ADMIN — CEFAZOLIN SODIUM 1 G: 1 INJECTION, SOLUTION INTRAVENOUS at 14:01

## 2017-01-01 RX ADMIN — WARFARIN SODIUM 5 MG: 5 TABLET ORAL at 18:39

## 2017-01-01 RX ADMIN — ACETAMINOPHEN 650 MG: 325 TABLET ORAL at 09:23

## 2017-01-01 RX ADMIN — METOPROLOL TARTRATE 25 MG: 25 TABLET ORAL at 13:12

## 2017-01-01 RX ADMIN — MONTELUKAST 10 MG: 10 TABLET, FILM COATED ORAL at 21:25

## 2017-01-01 RX ADMIN — MORPHINE SULFATE 2 MG: 2 INJECTION, SOLUTION INTRAMUSCULAR; INTRAVENOUS at 08:14

## 2017-01-01 RX ADMIN — GABAPENTIN 100 MG: 100 CAPSULE ORAL at 08:19

## 2017-01-01 RX ADMIN — ASPIRIN 81 MG: 81 TABLET ORAL at 08:33

## 2017-01-01 RX ADMIN — MORPHINE SULFATE 2 MG: 2 INJECTION, SOLUTION INTRAMUSCULAR; INTRAVENOUS at 20:15

## 2017-01-01 RX ADMIN — LIDOCAINE 1 PATCH: 50 PATCH CUTANEOUS at 09:18

## 2017-01-01 RX ADMIN — CYANOCOBALAMIN TAB 500 MCG 1000 MCG: 500 TAB at 08:19

## 2017-01-01 RX ADMIN — ATORVASTATIN CALCIUM 80 MG: 80 TABLET, FILM COATED ORAL at 08:19

## 2017-01-01 RX ADMIN — PROPOFOL 130 MG: 10 INJECTION, EMULSION INTRAVENOUS at 13:55

## 2017-01-01 RX ADMIN — PANTOPRAZOLE SODIUM 40 MG: 40 TABLET, DELAYED RELEASE ORAL at 08:31

## 2017-01-01 RX ADMIN — SODIUM CHLORIDE 50 ML/HR: 9 INJECTION, SOLUTION INTRAVENOUS at 23:27

## 2017-01-01 RX ADMIN — ASPIRIN 81 MG: 81 TABLET ORAL at 09:36

## 2017-01-01 RX ADMIN — ONDANSETRON 4 MG: 2 INJECTION INTRAMUSCULAR; INTRAVENOUS at 10:02

## 2017-01-01 RX ADMIN — MORPHINE SULFATE 1 MG: 2 INJECTION, SOLUTION INTRAMUSCULAR; INTRAVENOUS at 12:06

## 2017-01-01 RX ADMIN — EPHEDRINE SULFATE 15 MG: 50 INJECTION INTRAMUSCULAR; INTRAVENOUS; SUBCUTANEOUS at 14:16

## 2017-01-01 RX ADMIN — MORPHINE SULFATE 1 MG: 2 INJECTION, SOLUTION INTRAMUSCULAR; INTRAVENOUS at 14:15

## 2017-01-01 RX ADMIN — ATORVASTATIN CALCIUM 80 MG: 80 TABLET, FILM COATED ORAL at 18:27

## 2017-01-01 RX ADMIN — SODIUM CHLORIDE 75 ML/HR: 9 INJECTION, SOLUTION INTRAVENOUS at 05:59

## 2017-01-01 RX ADMIN — MORPHINE SULFATE 1 MG: 2 INJECTION, SOLUTION INTRAMUSCULAR; INTRAVENOUS at 16:08

## 2017-01-01 RX ADMIN — FOLIC ACID 1 MG: 1 TABLET ORAL at 08:19

## 2017-01-01 RX ADMIN — SODIUM CHLORIDE 50 ML/HR: 9 INJECTION, SOLUTION INTRAVENOUS at 20:34

## 2017-01-01 RX ADMIN — CEFAZOLIN SODIUM 1 G: 1 INJECTION, SOLUTION INTRAVENOUS at 05:18

## 2017-01-01 RX ADMIN — SERTRALINE 50 MG: 50 TABLET, FILM COATED ORAL at 09:07

## 2017-01-01 RX ADMIN — AMLODIPINE BESYLATE 5 MG: 5 TABLET ORAL at 08:19

## 2017-01-01 RX ADMIN — SODIUM CHLORIDE 50 ML/HR: 9 INJECTION, SOLUTION INTRAVENOUS at 05:34

## 2017-01-01 RX ADMIN — SERTRALINE 50 MG: 50 TABLET, FILM COATED ORAL at 08:56

## 2017-01-01 RX ADMIN — PANTOPRAZOLE SODIUM 40 MG: 40 TABLET, DELAYED RELEASE ORAL at 09:07

## 2017-01-01 RX ADMIN — FENTANYL CITRATE 50 MCG: 50 INJECTION INTRAMUSCULAR; INTRAVENOUS at 14:25

## 2017-01-01 RX ADMIN — MORPHINE SULFATE 2 MG: 2 INJECTION, SOLUTION INTRAMUSCULAR; INTRAVENOUS at 13:04

## 2017-01-01 RX ADMIN — ATORVASTATIN CALCIUM 80 MG: 80 TABLET, FILM COATED ORAL at 09:08

## 2017-01-01 RX ADMIN — LORAZEPAM 1 MG: 2 INJECTION INTRAMUSCULAR; INTRAVENOUS at 22:23

## 2017-01-01 RX ADMIN — SUCCINYLCHOLINE CHLORIDE 100 MG: 20 INJECTION, SOLUTION INTRAMUSCULAR; INTRAVENOUS; PARENTERAL at 17:06

## 2017-01-01 RX ADMIN — WARFARIN SODIUM 5 MG: 5 TABLET ORAL at 18:46

## 2017-01-01 RX ADMIN — AMLODIPINE BESYLATE 5 MG: 5 TABLET ORAL at 09:50

## 2017-01-01 RX ADMIN — PROPOFOL 100 MCG/KG/MIN: 10 INJECTION, EMULSION INTRAVENOUS at 16:52

## 2017-08-16 PROBLEM — S72.019A SUBCAPITAL FRACTURE OF HIP (HCC): Status: ACTIVE | Noted: 2017-01-01

## 2017-08-16 NOTE — ED NOTES
Pt fell in her room while trying to get out of bed. Pt complains of left hip pain & left side of head pain.     Kaylie Sosa RN  08/16/17 1939

## 2017-08-16 NOTE — ED PROVIDER NOTES
" EMERGENCY DEPARTMENT ENCOUNTER    CHIEF COMPLAINT  Chief Complaint: fall  History given by: patient  History limited by: nothing  Room Number: 02/02  PMD: Guzman Watkins MD      HPI:  Pt is a 87 y.o. female who presents complaining of left hip and a blow to the head s/p fall earlier today. Pt states that she fell secondary to losing her balance and landed on her left side on the floor. Pt states that her pain is worse with movement or palpation. Pt states that she uses a walker to ambulate at baseline    Duration:  Just PTA  Onset: sudden  Timing: constant  Location: left hip  Radiation: none  Quality: \"pain\"  Intensity/Severity: moderate  Progression: unchanged  Associated Symptoms: blow to the head  Aggravating Factors: movement, palpation  Alleviating Factors: none  Previous Episodes: Pt denies similar symptoms previously.  Treatment before arrival: none    PAST MEDICAL HISTORY  Active Ambulatory Problems     Diagnosis Date Noted   • No Active Ambulatory Problems     Resolved Ambulatory Problems     Diagnosis Date Noted   • No Resolved Ambulatory Problems     Past Medical History:   Diagnosis Date   • Arthritis    • Asthma    • Atrial fibrillation    • Disease of thyroid gland    • GERD (gastroesophageal reflux disease)    • Hyperlipidemia    • Hypertension    • Stroke        PAST SURGICAL HISTORY  History reviewed. No pertinent surgical history.    FAMILY HISTORY  History reviewed. No pertinent family history.    SOCIAL HISTORY  Social History     Social History   • Marital status:      Spouse name: N/A   • Number of children: N/A   • Years of education: N/A     Occupational History   • Not on file.     Social History Main Topics   • Smoking status: Not on file   • Smokeless tobacco: Not on file   • Alcohol use Not on file   • Drug use: Not on file   • Sexual activity: Not on file     Other Topics Concern   • Not on file     Social History Narrative   • No narrative on file       ALLERGIES  Review of " patient's allergies indicates no known allergies.    REVIEW OF SYSTEMS  Review of Systems   Constitutional: Negative for fever.   Respiratory: Negative for shortness of breath.    Cardiovascular: Negative for chest pain.   Musculoskeletal: Positive for arthralgias (L hip).        Head injury       PHYSICAL EXAM  ED Triage Vitals   Temp Heart Rate Resp BP SpO2   08/16/17 1939 08/16/17 1939 08/16/17 1939 08/16/17 1939 08/16/17 1939   98 °F (36.7 °C) 81 16 166/87 99 %      Temp src Heart Rate Source Patient Position BP Location FiO2 (%)   -- -- -- -- --              Physical Exam   Constitutional: She is oriented to person, place, and time. No distress.   HENT:   Head: Normocephalic. Head is with contusion (left parietal scalp).   Eyes: EOM are normal. Pupils are equal, round, and reactive to light.   Neck: Normal range of motion. Neck supple.   Cardiovascular: Normal rate, regular rhythm and normal heart sounds.    Pulses:       Dorsalis pedis pulses are 2+ on the right side, and 2+ on the left side.   Pulmonary/Chest: Effort normal and breath sounds normal. No respiratory distress.   Abdominal: Soft. There is no tenderness. There is no rebound and no guarding.   Musculoskeletal: She exhibits no edema.        Right hip: She exhibits normal range of motion and no tenderness.        Left hip: She exhibits decreased range of motion (due to pain) and tenderness.   Neurological: She is alert and oriented to person, place, and time. She has normal sensation and normal strength.   Skin: Skin is warm and dry. No rash noted.   Psychiatric: Mood and affect normal.   Nursing note and vitals reviewed.      LAB RESULTS  Lab Results (last 24 hours)     Procedure Component Value Units Date/Time    CBC & Differential [[id number]] Collected:  08/16/17 2015    Specimen:  Blood Updated:  08/16/17 2026    Narrative:       The following orders were created for panel order CBC & Differential.  Procedure                                Abnormality         Status                     ---------                               -----------         ------                     CBC Auto Differential[[id number]]        Abnormal            Final result                 Please view results for these tests on the individual orders.    Basic Metabolic Panel [[id number]]  (Abnormal) Collected:  08/16/17 2015    Specimen:  Blood Updated:  08/16/17 2051     Glucose 104 (H) mg/dL      BUN 23 mg/dL      Creatinine 0.88 mg/dL      Sodium 139 mmol/L      Potassium 4.3 mmol/L      Chloride 105 mmol/L      CO2 19.8 (L) mmol/L      Calcium 9.3 mg/dL      eGFR Non African Amer 61 mL/min/1.73      BUN/Creatinine Ratio 26.1 (H)     Anion Gap 14.2 mmol/L     Narrative:       The MDRD GFR formula is only valid for adults with stable renal function between ages 18 and 70.    Protime-INR [[id number]]  (Abnormal) Collected:  08/16/17 2015    Specimen:  Blood Updated:  08/16/17 2101     Protime 41.1 (H) Seconds      INR 4.45 (C)    CBC Auto Differential [[id number]]  (Abnormal) Collected:  08/16/17 2015    Specimen:  Blood Updated:  08/16/17 2026     WBC 4.88 10*3/mm3      RBC 3.09 (L) 10*6/mm3      Hemoglobin 10.1 (L) g/dL      Hematocrit 32.4 (L) %      .9 (H) fL      MCH 32.7 (H) pg      MCHC 31.2 (L) g/dL      RDW 15.9 (H) %      RDW-SD 61.0 (H) fl      MPV 8.5 fL      Platelets 192 10*3/mm3      Neutrophil % 43.0 %      Lymphocyte % 45.1 %      Monocyte % 9.0 %      Eosinophil % 2.3 %      Basophil % 0.2 %      Immature Grans % 0.4 %      Neutrophils, Absolute 2.10 10*3/mm3      Lymphocytes, Absolute 2.20 10*3/mm3      Monocytes, Absolute 0.44 10*3/mm3      Eosinophils, Absolute 0.11 10*3/mm3      Basophils, Absolute 0.01 10*3/mm3      Immature Grans, Absolute 0.02 10*3/mm3           I ordered the above labs and reviewed the results    RADIOLOGY  XR Hip With or Without Pelvis 2 - 3 View Left    (Results Pending)   CT Head Without Contrast    (Results Pending)      2028-  Reviewed pt's CT Head, which shows no fracture, no bleed and two areas of subacute stroke. Independently viewed by me. Interpreted by radiologist. Discussed with Dr. Delgadillo.    2037- Reviewed pt's L hip XR, which shows a subcapital left hip fracture. Independently viewed by me. Interpreted by radiologist. Discussed with Dr. Delgadillo.      I ordered the above noted radiological studies. Interpreted by radiologist. Discussed with radiologist (Dr. Delgadillo). Reviewed by me in PACS.       PROCEDURES  Procedures      PROGRESS AND CONSULTS  ED Course     1955- Discussed the plan to order a L hip XR and CT Head for further evaluation. Pt agrees with the plan and all questions were addressed.    1958- Ordered blood work, PT with INR, type and screen, L hip XR and CT Head for further evaluation. Ordered morphine and zofran for pain.    2032- Ordered prior medical records from [facility] for further evaluation.     2053- Placed calls to orthopedic surgery for consult and to [facility] for admission.    2055- Rechecked pt. Pt is resting comfortably. Notified pt of her imaging results, including her left subcapital hip fracture. Discussed the plan to admit the pt for surgery and pain control. Pt agrees with the plan and all questions were addressed.    2122- Discussed the pt's case with Dr. Rojas ([facility]) who agrees to admit the pt to a Med/Surg bed. He requests that I give the pt FFP and Vitamin K, which I will do.    2124- Discussed the pt's case with Dr. Greene ([facility]) who agrees to consult.     2124- Ordered vitamin K and 2 units of FFP for pt's coumadin coagulopathy.    MEDICAL DECISION MAKING  Results were reviewed/discussed with the patient and they were also made aware of online access. Pt also made aware that some labs, such as cultures, will not be resulted during ER visit and follow up with PMD is necessary.     MDM  Number of Diagnoses or Management Options     Amount and/or Complexity of Data Reviewed  Clinical lab tests: ordered and  reviewed (INR=4.45)  Tests in the radiology section of CPT®: ordered and reviewed (CT head shows nothing acute)  Discussion of test results with the performing providers: yes (D/w Dr. Delgadillo)  Decide to obtain previous medical records or to obtain history from someone other than the patient: yes  Review and summarize past medical records: yes ( Pt was recently admitted to [facility] for back pain and a compression fracture. Pt had kyphoplasty during her admission and a stroke post-operatively)  Discuss the patient with other providers: yes (D/w Dr. Rojas ([facility]) and Dr. Greene (ortho))  Independent visualization of images, tracings, or specimens: yes           DIAGNOSIS  Final diagnoses:   Subcapital fracture of hip, left, closed, initial encounter   Closed head injury, initial encounter   Fall, initial encounter   Warfarin-induced coagulopathy       DISPOSITION  ADMISSION    Discussed treatment plan and reason for admission with pt/family and admitting physician.  Pt/family voiced understanding of the plan for admission for further testing/treatment as needed.     Latest Documented Vital Signs:  As of 9:38 PM  BP- 166/87 HR- 81 Temp- 98 °F (36.7 °C) O2 sat- 99%    --  Documentation assistance provided by meli Arellano for Dr. Patiño.  Information recorded by the meli was done at my direction and has been verified and validated by me.       Estee Arellano  08/16/17 1845       Chito Patiño MD  08/17/17 9162

## 2017-08-17 PROBLEM — I10 HTN (HYPERTENSION): Status: ACTIVE | Noted: 2017-01-01

## 2017-08-17 PROBLEM — I48.91 ATRIAL FIBRILLATION (HCC): Status: ACTIVE | Noted: 2017-01-01

## 2017-08-17 PROBLEM — Z92.29 HX: LONG TERM ANTICOAGULANT USE: Status: ACTIVE | Noted: 2017-01-01

## 2017-08-17 PROBLEM — E03.9 HYPOTHYROIDISM: Status: ACTIVE | Noted: 2017-01-01

## 2017-08-17 NOTE — DISCHARGE PLACEMENT REQUEST
"Rosetta Hemphill (87 y.o. Female)     Date of Birth Social Security Number Address Home Phone MRN    01/04/1930  [address]  [facility] [place] [address] [phone] [id number]    Yarsani Marital Status          Unknown        Admission Date Admission Type Admitting Provider Attending Provider Department, Room/Bed    8/16/17 Emergency Sana Rojas MD Beard, Lyle E, MD [address], 533/1    Discharge Date Discharge Disposition Discharge Destination                      Attending Provider: Kye Tillman MD     Allergies:  No Known Allergies    Isolation:  None   Infection:  None   Code Status:  FULL    Ht:  58\" (147.3 cm)   Wt:  119 lb 12.8 oz (54.3 kg)    Admission Cmt:  None   Principal Problem:  Subcapital fracture of hip [S72.019A]                 Active Insurance as of 8/16/2017     Primary Coverage     Payor Plan Insurance Group Employer/Plan Group    MEDICARE MEDICARE A & B      Payor Plan Address Payor Plan Phone Number Effective From Effective To    [address] [phone] 1/1/1995     [place] [address]       Subscriber Name Subscriber Birth Date Member ID       ROSETTA HEMPHILL 1/4/1930 [id number]           Secondary Coverage     Payor Plan Insurance Group Employer/Plan Group    [facility] SUPP [id number]     Payor Plan Address Payor Plan Phone Number Effective From Effective To    [address]  12/1/2016     [place] [address]       Subscriber Name Subscriber Birth Date Member ID       ROSETTA HEMPHILL 1/4/1930 [id number]                 Emergency Contacts      (Rel.) Home Phone Work Phone Mobile Phone    Andrew Hemphill (Son) [phone] -- [phone]              "

## 2017-08-17 NOTE — CODE DOCUMENTATION
Spoke with Team D pager, Dr. Omega Hairston. Orders received for CT head without contrast and neuro checks. RRT spoke with primary RN, returned care to primary RN. Told to call neurologist if CT results remarkable and to call RRT if any further concerns.

## 2017-08-17 NOTE — PLAN OF CARE
Problem: Patient Care Overview (Adult)  Goal: Plan of Care Review  Outcome: Ongoing (interventions implemented as appropriate)    08/17/17 0140   Coping/Psychosocial Response Interventions   Plan Of Care Reviewed With patient   Patient Care Overview   Progress no change   Outcome Evaluation   Outcome Summary/Follow up Plan Patient admitted from rehab/nursing facility d/t Left hip fx r/t fall. Patient's INR 4.45 on admit.. Vitamin K given and 2 units of FFP. INR now down to 1.86. Dr. Whalen consulted for hip fx. Morphine ordered for pain. Lopressor given for elevated BP.

## 2017-08-17 NOTE — CONSULTS
Orthopedic Consult    Patient: Rosetta Hemphill                                           YOB: 1930     Date of Admission: 8/16/2017  7:41 PM            Medical Record Number: [id number]    Attending Physician: Kye Tillman MD    Consulting Physician: Olayinka Krishnamurthy MD    Reason for Consult: L displaced femoral neck hip fracture.    History of Present Illness: 87 y.o. female admitted to [facility] with Warfarin-induced coagulopathy [T45.511A, D68.9]  Closed head injury, initial encounter [S09.90XA]  Fall, initial encounter [W19.XXXA]  Subcapital fracture of hip, left, closed, initial encounter [S72.012A].     The patient was evaluated in the emergency room and was diagnosed with a  hip fracture. Secondary to the age and multiple medical co morbidities the patient was admitted to the hospitalist. As I was on call for the emergency room I was consulted for further evaluation and treatment. The patient was in rehab recovering from stroke and vertebral compression fractures and fell from standing height, Resulting in sudden onset hip pain and inability to ambulate. Denies any history of loss of consciousness, headache, vomiting, or seizures.   Denies any other injuries. The patient is accompanied by her son family members  to this hospital visit.   The patient denies any prior  pre-existing pain in the hip or prior use of any assistive device.   The patient  lives at rehab/snf she is mostly bed to wheel chair with occasional use of a walker.  The patient denies history of dementia.    Patient denies any history of: DVT/PE, MRSA, COPD, CHF, CAD, Diabetes mellitus, Dementia    The patient has history of :Afib  The patient is on anticoagulants: warfarin    Past medical history, past surgical history, social history, family history, ALLERGIES, current medications have been reviewed by me.    Past Medical History:   Diagnosis Date   • Arthritis    • Asthma    • Atrial fibrillation    • Disease of  thyroid gland    • GERD (gastroesophageal reflux disease)    • Hyperlipidemia    • Hypertension    • Stroke      History reviewed. No pertinent surgical history.  Social History     Occupational History   • Not on file.     Social History Main Topics   • Smoking status: Never Smoker   • Smokeless tobacco: Not on file   • Alcohol use Not on file   • Drug use: Not on file   • Sexual activity: Not on file      Social History     Social History Narrative     History reviewed. No pertinent family history.     No Known Allergies    Home Medications:  Prescriptions Prior to Admission   Medication Sig Dispense Refill Last Dose   • amLODIPine (NORVASC) 5 MG tablet Take 5 mg by mouth Daily.      • aspirin 81 MG EC tablet Take 81 mg by mouth Daily.      • atorvastatin (LIPITOR) 80 MG tablet Take 80 mg by mouth Daily.      • Biotin 10 MG tablet Take 5 mg by mouth Daily.      • carvedilol (COREG) 12.5 MG tablet Take 12.5 mg by mouth 2 (Two) Times a Day With Meals.      • fluticasone (FLONASE) 50 MCG/ACT nasal spray 1 spray into each nostril Daily As Needed for Rhinitis.      • gabapentin (NEURONTIN) 100 MG capsule Take 100 mg by mouth Daily.      • levothyroxine (SYNTHROID, LEVOTHROID) 75 MCG tablet Take 75 mcg by mouth Daily.      • lidocaine (LIDODERM) 5 % Place 1 patch on the skin 2 (Two) Times a Day. Remove & Discard patch within 12 hours or as directed by MD      • montelukast (SINGULAIR) 10 MG tablet Take 10 mg by mouth Every Night.      • pantoprazole (PROTONIX) 40 MG EC tablet Take 40 mg by mouth Daily.      • potassium chloride ER (K-TAB) 20 MEQ tablet controlled-release ER tablet Take 20 mEq by mouth 2 (Two) Times a Day.      • warfarin (COUMADIN) 5 MG tablet Take 5 mg by mouth Daily.          Current Medications:  Scheduled Meds:  amLODIPine 5 mg Oral Daily   aspirin 81 mg Oral Daily   atorvastatin 80 mg Oral Daily   carvedilol 12.5 mg Oral BID With Meals   gabapentin 100 mg Oral Daily   levothyroxine 75 mcg Oral Q AM    metoprolol tartrate 25 mg Oral BID   montelukast 10 mg Oral Nightly   pantoprazole 40 mg Oral Q AM   potassium chloride 10 mEq Oral Daily     Continuous Infusions:   PRN Meds:.fluticasone  •  Morphine  •  ondansetron  •  sodium chloride  •  Insert peripheral IV **AND** sodium chloride    Review of Systems:   A 12 point system review was reviewed with the patient and from the chart  and is negative except as in history of present illness.      Physical Exam: 87 y.o. female                    Vitals:    08/17/17 0717 08/17/17 0718 08/17/17 0832 08/17/17 0859   BP:   (!) 183/74    BP Location:   Right arm    Patient Position:   Lying    Pulse:  69 69    Resp:       Temp:       TempSrc:       SpO2: 92%  98% 91%   Weight:       Height:            Gait: Could not be tested , patient is nonambulatory.    Mental/HEENT/cardio/skin: The patient's general appearance was well-nourished, well-hydrated, no acute distress.  Orientation was alert and oriented ×3.  The patient's mood was normal.   Pulmonary exam shows normal late exchange, no labored breathing, or shortness of breath.    The skin exam showed normal temperature and color in the area of examination.    Extremities:  Left   lower extremity positive shortening, positive external rotation, attempted movements of the  hip are painful and restricted. The patient is able to do gentle active range of motion of her toes. Gross sensation is intact over the toes.    Pulses: Pulses palpable and equal bilaterally    Diagnostic Tests:      Results from last 7 days  Lab Units 08/17/17  0508 08/16/17 2015   WBC 10*3/mm3 7.55 4.88   HEMOGLOBIN g/dL 10.8* 10.1*   HEMATOCRIT % 33.7* 32.4*   PLATELETS 10*3/mm3 214 192       Results from last 7 days  Lab Units 08/17/17  0508 08/16/17 2015   SODIUM mmol/L 137 139   POTASSIUM mmol/L 4.0 4.3   CHLORIDE mmol/L 100 105   CO2 mmol/L 21.1* 19.8*   BUN mg/dL 17 23   CREATININE mg/dL 0.72 0.88   GLUCOSE mg/dL 109* 104*   CALCIUM mg/dL 9.3 9.3        Results from last 7 days  Lab Units 08/17/17  0508 08/17/17  0052 08/16/17 2015   INR  1.88* 1.86* 4.45*     No results found for: CRYSTAL]  No results found for: CULTURE]  No results found for: URICACID]  Ct Head Without Contrast    Result Date: 8/17/2017  Narrative: CT HEAD WITHOUT CONTRAST  HISTORY: Slurred speech, confusion.  TECHNIQUE: A noncontrasted CT examination of the brain was performed and compared to the CT examination of 08/16/2017.  FINDINGS: Again identified are areas of decreased attenuation involving the right parietal and occipital lobes posterolaterally extending to the superior and posterior aspect of the right temporal lobe suggesting an acute to subacute infarct. Smaller areas of decreased attenuation involving the right frontal lobe laterally and involving the insular cortex on the right are appreciated also present previously and unchanged. No new area of decreased attenuation to suggest acute infarction is identified. There is no evidence of hemorrhage. Further evaluation could be performed with a MRI examination of the brain as indicated.  The above information was called to the patient's nurse at time of the dictation. The patient's nurse is to immediately relay the information to the clinical service.      Ct Head Without Contrast    Result Date: 8/17/2017  Narrative: CT HEAD WITHOUT CONTRAST  HISTORY: Fall, hit head, headache, on Coumadin.  COMPARISON: None.  FINDINGS: Bone windows showed no evidence of a calvarial fracture. The brain ventricles are symmetrical. There is moderate atrophy. Moderate facet calcification is noted.  An air-fluid level is present in the right maxillary sinus with moderate mucosal thickening involving the right maxillary sinus present.  There is an area decreased attenuation involving the right parietal lobe laterally suspicious for a moderate size acute to subacute infarct. This measures approximately 5 cm in AP dimension. A smaller area of decreased  attenuation is appreciated involving the cortex of the right frontal lobe superiolaterally measuring approximately 1.2 cm in transverse dimension. There is no evidence of hemorrhage. A small scalp hematoma is noted in the left parietal region.  Further evaluation could be performed with MRI examination of the brain as indicated.  The above information was called to and discussed with Dr. Patiño.  This report was finalized on 8/17/2017 6:57 AM by Dr. Mika Delgadillo MD.      Xr Hip With Or Without Pelvis 2 - 3 View Left    Result Date: 8/17/2017  Narrative: PELVIS AND LEFT HIP  HISTORY fall, pain.  FINDINGS: An AP view of the pelvis as well as AP and crosstable lateral views of the left hip demonstrates a subcapital fracture on the left. There is no evidence of dislocation. There is moderate displacement. The above information was called to and discussed with Dr. Patiño.  This report was finalized on 8/17/2017 6:57 AM by Dr. Mika Delgadillo MD.        Assessment: Left Intracapsular Hip Fracture. Omi type     Patient Active Problem List   Diagnosis   • Subcapital fracture of hip   • Atrial fibrillation   • HX: long term anticoagulant use   • Hypothyroidism   • HTN (hypertension)       Plan:    The patient is indicated for a  partial hip arthroplasty.  The likely,  Risks and benefits of the procedure including but not limited to infection, DVT, pulmonary embolism,  leg length discrepancy, recurrent dislocation, possibility of injury to nerves or vessels, possibility of periprosthetic fractures have been discussed in detail with the patient and her son Andrew Hemphill.  Despite th/e risks involved, The patient would like t/o proceed.  The patient is being scheduled for a left partial hip arthroplasty by posterior approach at [facility] tentatively for 8/18/17 .     Patient will be made NPO at midnight .   Obtain informed consent.   The patients coumadin has been on hold vit K and FFP  The patient's admitting service has  seen the patient and the patient is cleared to the operating room.      Date: 8/17/2017    Olayinka Krishnamurthy MD    Addendum:    I have personally examined this patient. I agree with the above findings and treatment  plan. I have talked to her son and explained the surgery. Her INR has normalized.     Iam Greene MD  8/18/2017

## 2017-08-17 NOTE — ED NOTES
Pt c/o left hip pain after fall today. Pt states that she lost balance and fell on carpeted floor. Pt hit head on floor . Denies loc. Denies n/v, vision changes. Assessment of lower extremities limited due to pt position. Pedal pulse present. Pt tender to palpation to posterior hip     Arleen Vargas RN  08/16/17 2013

## 2017-08-17 NOTE — CODE DOCUMENTATION
Blood sugar 110. RRT called due to slurred speech. MD called and orders received for CT head without contrast and to progress to a Team D

## 2017-08-17 NOTE — H&P
Internal medicine history and physical    INTERNAL MEDICINE   [facility]       Patient Identification:  Name: Rosetta Hemphill  Age: 87 y.o.  Sex: female  :  1930  MRN: [id number]                   Primary Care Physician: Guzman Watkins MD                                   Chief Complaint:  Sent from nursing home for pain and discomfort in the left hip after the fall on the carpeted floor earlier today    History of Present Illness:   Patient is a 87-year-old female who has complicated past medical history including recent hospitalization at [facility] for surgery.  It was for kyphoplasty for vertebral compression fracture.  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  According to her she was adjusting her bedsheets when she lost her balance and fell on her left side.  She is unable to get up or bear weight on the leg.  And her left hip is hurting.  This resulted in her being transferred to the emergency room for further evaluation where workup revealed subcapital fracture of the left hip and contusion of the left scalp.  Patient denies losing consciousness or incontinence.      Past Medical History:  Past Medical History:   Diagnosis Date   • Arthritis    • Asthma    • Atrial fibrillation    • Disease of thyroid gland    • GERD (gastroesophageal reflux disease)    • Hyperlipidemia    • Hypertension    • Stroke      Past Surgical History:  History reviewed. No pertinent surgical history.   Home Meds:  Prescriptions Prior to Admission   Medication Sig Dispense Refill Last Dose   • amLODIPine (NORVASC) 5 MG tablet Take 5 mg by mouth Daily.      • aspirin 81 MG EC tablet Take 81 mg by mouth Daily.      • atorvastatin (LIPITOR) 80 MG tablet Take 80 mg by mouth Daily.      • Biotin 10 MG tablet Take 5 mg by mouth Daily.      • carvedilol (COREG) 12.5 MG tablet Take 12.5 mg by mouth 2 (Two) Times a Day With Meals.      • fluticasone (FLONASE)  50 MCG/ACT nasal spray 1 spray into each nostril Daily As Needed for Rhinitis.      • gabapentin (NEURONTIN) 100 MG capsule Take 100 mg by mouth Daily.      • levothyroxine (SYNTHROID, LEVOTHROID) 75 MCG tablet Take 75 mcg by mouth Daily.      • lidocaine (LIDODERM) 5 % Place 1 patch on the skin 2 (Two) Times a Day. Remove & Discard patch within 12 hours or as directed by MD      • montelukast (SINGULAIR) 10 MG tablet Take 10 mg by mouth Every Night.      • pantoprazole (PROTONIX) 40 MG EC tablet Take 40 mg by mouth Daily.      • potassium chloride ER (K-TAB) 20 MEQ tablet controlled-release ER tablet Take 20 mEq by mouth 2 (Two) Times a Day.      • warfarin (COUMADIN) 5 MG tablet Take 5 mg by mouth Daily.        Current Meds:     Current Facility-Administered Medications:   •  amLODIPine (NORVASC) tablet 5 mg, 5 mg, Oral, Daily, Sana Rojas MD  •  aspirin EC tablet 81 mg, 81 mg, Oral, Daily, Sana Rojas MD  •  atorvastatin (LIPITOR) tablet 80 mg, 80 mg, Oral, Daily, Sana Rojas MD  •  carvedilol (COREG) tablet 12.5 mg, 12.5 mg, Oral, BID With Meals, Sana Rojas MD  •  fluticasone (FLONASE) 50 MCG/ACT nasal spray 1 spray, 1 spray, Nasal, Daily PRN, Sana Rojas MD  •  gabapentin (NEURONTIN) capsule 100 mg, 100 mg, Oral, Daily, Sana Rojas MD  •  levothyroxine (SYNTHROID, LEVOTHROID) tablet 75 mcg, 75 mcg, Oral, Daily, Sana Rojas MD  •  metoprolol tartrate (LOPRESSOR) tablet 25 mg, 25 mg, Oral, BID, Sana Rojas MD, 25 mg at 08/17/17 0022  •  montelukast (SINGULAIR) tablet 10 mg, 10 mg, Oral, Nightly, Sana Rojas MD  •  morphine injection 2 mg, 2 mg, Intravenous, Q4H PRN, Sana Rojas MD, 2 mg at 08/17/17 0021  •  ondansetron (ZOFRAN) injection 4 mg, 4 mg, Intravenous, Q6H PRN, Sana Rojas MD  •  pantoprazole (PROTONIX) EC tablet 40 mg, 40 mg, Oral, Daily, Jawfermin Rojas MD  •  potassium chloride (MICRO-K) CR capsule 10 mEq, 10 mEq, Oral, Daily, Sana Rojas MD  •  sodium chloride 0.9 % flush 1-10 mL, 1-10 mL,  "Intravenous, PRN, Jawed MD Bob  •  Insert peripheral IV, , , Once **AND** sodium chloride 0.9 % flush 10 mL, 10 mL, Intravenous, PRN, Chito Patiño MD  Allergies:  No Known Allergies  Social History:   Social History   Substance Use Topics   • Smoking status: Not on file   • Smokeless tobacco: Not on file   • Alcohol use Not on file      Family History:  History reviewed. No pertinent family history.       Review of Systems  See history of present illness and past medical history - limited by her expressive aphasia  Constitutional: Negative for fever.   Respiratory: Negative for shortness of breath.    Cardiovascular: Negative for chest pain.   Musculoskeletal: Positive for arthralgias (L hip).        Vitals:   /75  Pulse 70  Temp 97.6 °F (36.4 °C) (Oral)   Resp 18  Ht 58\" (147.3 cm)  Wt 119 lb 12.8 oz (54.3 kg)  SpO2 92%  BMI 25.04 kg/m2  I/O:   Intake/Output Summary (Last 24 hours) at 08/17/17 0024  Last data filed at 08/16/17 2322   Gross per 24 hour   Intake              300 ml   Output                0 ml   Net              300 ml     Exam:  General Appearance:    Alert, cooperative, no distress, appears stated age   Head:    Normocephalic, without obvious abnormality, Contusion of the left parietal scalp    Eyes:    PERRL, conjunctiva/corneas clear, EOM's intact, both eyes   Ears:    Normal external ear canals, both ears   Nose:   Nares normal, septum midline, mucosa normal, no drainage    or sinus tenderness   Throat:   Lips, tongue, gums normal; oral mucosa pink and moist   Neck:   Supple, symmetrical, trachea midline, no adenopathy;     thyroid:  no enlargement/tenderness/nodules; no carotid    bruit or JVD   Back:     Symmetric, no curvature, ROM normal, no CVA tenderness   Lungs:     Clear to auscultation bilaterally, respirations unlabored   Chest Wall:    No tenderness or deformityLeft upper chest permanent pacemaker in place     Heart:    Regular rate and rhythm, S1 and S2 normal, no " murmur, rub   or gallop   Abdomen:     Soft, non-tender, bowel sounds active all four quadrants,     no masses, no hepatomegaly, no splenomegaly   Extremities:   Left hip tenderness with limited range of motion due to pain..     Pulses:   Pulses palpable in all extremities; symmetric all extremities   Skin:   Skin color normal, Skin is warm and dry,  no rashes or palpable lesions   Neurologic:   Away consciousness questions appropriately and has element of expressive aphasia.       Data Review:      I reviewed the patient's new clinical results.    Results from last 7 days  Lab Units 08/16/17 2015   WBC 10*3/mm3 4.88   HEMOGLOBIN g/dL 10.1*   PLATELETS 10*3/mm3 192       Results from last 7 days  Lab Units 08/16/17 2015   SODIUM mmol/L 139   POTASSIUM mmol/L 4.3   CHLORIDE mmol/L 105   CO2 mmol/L 19.8*   BUN mg/dL 23   CREATININE mg/dL 0.88   CALCIUM mg/dL 9.3   GLUCOSE mg/dL 104*       Assessment:  Active Hospital Problems (** Indicates Principal Problem)    Diagnosis Date Noted   • **Subcapital fracture of hip [S72.019A] 08/16/2017     Closed head injury, initial encounter   Fall, initial encounter   Warfarin-induced coagulopathy       Plan:  Admit the patient, hold her Coumadin, vitamin K and fresh frozen plasma, orthopedic surgery evaluation, pain control and continue her home medications.    Sana Rojas MD   8/16/2017  10:24 PM     Much of this encounter note is an electronic transcription/translation of spoken language to printed text. The electronic translation of spoken language may permit erroneous, or at times, nonsensical words or phrases to be inadvertently transcribed; Although I have reviewed the note for such errors, some may still exist

## 2017-08-17 NOTE — PLAN OF CARE
Problem: Patient Care Overview (Adult)  Goal: Plan of Care Review  Outcome: Ongoing (interventions implemented as appropriate)    08/17/17 0140 08/17/17 1450 08/17/17 1545   Coping/Psychosocial Response Interventions   Plan Of Care Reviewed With --  patient --    Patient Care Overview   Progress no change --  --    Outcome Evaluation   Outcome Summary/Follow up Plan --  --  No falls during shift. Complaints of pain when transferring. RRT called this am for neuro change, CT ordered, no change. Surgery tomorrow,. NPO at midnight. Can get agitated at times. VS stable. Meds given per order. Lethargic during shift. Will continue to monitor.        Goal: Adult Individualization and Mutuality  Outcome: Ongoing (interventions implemented as appropriate)  Goal: Discharge Needs Assessment  Outcome: Ongoing (interventions implemented as appropriate)    Problem: Fall Risk (Adult)  Goal: Identify Related Risk Factors and Signs and Symptoms  Outcome: Ongoing (interventions implemented as appropriate)  Goal: Absence of Falls  Outcome: Ongoing (interventions implemented as appropriate)    Problem: Pain, Acute (Adult)  Goal: Identify Related Risk Factors and Signs and Symptoms  Outcome: Ongoing (interventions implemented as appropriate)  Goal: Acceptable Pain Control/Comfort Level  Outcome: Ongoing (interventions implemented as appropriate)    Problem: Anxiety (Adult)  Goal: Identify Related Risk Factors and Signs and Symptoms  Outcome: Ongoing (interventions implemented as appropriate)  Goal: Reduction/Resolution  Outcome: Ongoing (interventions implemented as appropriate)

## 2017-08-17 NOTE — PROGRESS NOTES
"DAILY PROGRESS NOTE  [facility]    Patient Identification:  Name: Rosetta Hemphill  Age: 87 y.o.  Sex: female  :  1930  MRN: [id number]         Primary Care Physician: Guzman Watkins MD    Subjective:  Interval History:Complains of hip pain    Objective:    Scheduled Meds:  amLODIPine 5 mg Oral Daily   aspirin 81 mg Oral Daily   atorvastatin 80 mg Oral Daily   carvedilol 12.5 mg Oral BID With Meals   gabapentin 100 mg Oral Daily   levothyroxine 75 mcg Oral Q AM   metoprolol tartrate 25 mg Oral BID   montelukast 10 mg Oral Nightly   pantoprazole 40 mg Oral Q AM   potassium chloride 10 mEq Oral Daily   [START ON 2017] orthopedic surgery cocktail 2 ([facility])  Injection Once     Continuous Infusions:     Vital signs in last 24 hours:  Temp:  [97.6 °F (36.4 °C)-98.2 °F (36.8 °C)] 98.2 °F (36.8 °C)  Heart Rate:  [] 69  Resp:  [16-20] 20  BP: (141-185)/(64-92) 141/64    Intake/Output:    Intake/Output Summary (Last 24 hours) at 17 1521  Last data filed at 17 1200   Gross per 24 hour   Intake              420 ml   Output                0 ml   Net              420 ml       Exam:  /64 (BP Location: Right arm, Patient Position: Lying)  Pulse 69  Temp 98.2 °F (36.8 °C) (Oral)   Resp 20  Ht 58\" (147.3 cm)  Wt 119 lb 12.8 oz (54.3 kg)  SpO2 96%  BMI 25.04 kg/m2    General Appearance:    Alert, cooperative, no distress   Head:    Normocephalic, without obvious abnormality, atraumatic   Eyes:       Throat:   Lips, tongue, gums normal   Neck:   Supple, symmetrical, trachea midline, no JVD   Lungs:     Clear to auscultation bilaterally, respirations unlabored   Chest Wall:    No tenderness or deformity    Heart:    Regular rate and rhythm, S1 and S2 normal, no murmur,no  Rub or gallop   Abdomen:     Soft, non-tender, bowel sounds active, no masses, no organomegaly    Extremities:   Extremities normal, atraumatic, no cyanosis or edema   Pulses:      Skin:   Skin is warm and dry,  " no rashes or palpable lesions   Neurologic:   no focal deficits noted      [unfilled]  Data Review:    Results from last 7 days  Lab Units 08/17/17  0508 08/16/17 2015   SODIUM mmol/L 137 139   POTASSIUM mmol/L 4.0 4.3   CHLORIDE mmol/L 100 105   CO2 mmol/L 21.1* 19.8*   BUN mg/dL 17 23   CREATININE mg/dL 0.72 0.88   GLUCOSE mg/dL 109* 104*   CALCIUM mg/dL 9.3 9.3       Results from last 7 days  Lab Units 08/17/17  0508 08/16/17 2015   WBC 10*3/mm3 7.55 4.88   HEMOGLOBIN g/dL 10.8* 10.1*   HEMATOCRIT % 33.7* 32.4*   PLATELETS 10*3/mm3 214 192       Results from last 7 days  Lab Units 08/17/17  0508   TSH mIU/mL 10.390*           Lab Results  Lab Value Date/Time   TROPONINT <0.010 08/17/2017 0508           Results from last 7 days  Lab Units 08/17/17  0508   ALK PHOS U/L 99   BILIRUBIN mg/dL 0.5   ALT (SGPT) U/L 25   AST (SGOT) U/L 32       Results from last 7 days  Lab Units 08/17/17  0508   TSH mIU/mL 10.390*         Glucose   Date/Time Value Ref Range Status   08/17/2017 0657 110 70 - 130 mg/dL Final       Results from last 7 days  Lab Units 08/17/17  0508 08/17/17  0052 08/16/17 2015   INR  1.88* 1.86* 4.45*       Patient Active Problem List   Diagnosis Code   • Subcapital fracture of hip S72.019A   • Atrial fibrillation I48.91   • HX: long term anticoagulant use Z79.01   • Hypothyroidism E03.9   • HTN (hypertension) I10       Assessment:  Principal Problem:    Subcapital fracture of hip  Active Problems:    Atrial fibrillation    HX: long term anticoagulant use    Hypothyroidism    HTN (hypertension)      Plan:  As per ortho surgery tomorrow. Coumadin on hold.    Kye Tillman MD  8/17/2017  3:21 PM

## 2017-08-17 NOTE — PROGRESS NOTES
Continued Stay Note  [facility]     Patient Name: Rosetta Hemphill  MRN: [id number]  Today's Date: 8/17/2017    Admit Date: 8/16/2017          Discharge Plan       08/17/17 0958    Case Management/Social Work Plan    Plan -    Additional Comments Recieved call from Rae with [facility], pt is from Skilled rehab, no bed hold but can return. Everardo MENESES/CCP              Discharge Codes     None            Hlolie Elizabeth, RN

## 2017-08-17 NOTE — NURSING NOTE
No change to STAT CT from this am after RRT. Called Poonam LOGAN to relay to MD. Will continue to monitor

## 2017-08-18 NOTE — OP NOTE
OPERATIVE NOTE    Patient: Rosetta Hemphill  YOB: 1930  Medical Record Number: [id number]  Attending Physician: Kye Tillman MD    Date of Service: 8/18/2017    PREOPERATIVE DIAGNOSES:  1. Left hip intracapsular femoral neck fracture     2. Osteopenia.      POSTOPERATIVE DIAGNOSES:  1. Left  hip intracapsular femoral neck fracture Garden type 4.    2. Osteopenia.      PROCEDURES PERFORMED: DC PARTIAL HIP REPLACEMENT [46871] (HIP ENDOPROSTHESIS) utilizing a posterior  approach with  [employer] limited Evolve stem with centralizer size 3, 134 mm long, 42 mm offset, bipolar head size 28/46 mm, femoral head size 28 mm medium (Paxeon).     SURGEON: Iam Greene MD    ASSISTANT: Olayinka Krishnamurthy MD, Fellow    FUENTES Holman    The services of a first assist were necessary for performing the procedure safely and expeditiously.  The first assist was present for the entire duration of the case and helped with positioning, retraction and closure of the incision.    ANESTHESIA: General anesthesia.      ESTIMATED BLOOD LOSS: 100 mL.      SPECIMENS: Nil.      COMPLICATIONS: Nil.      DRAINS: Nil.      INDICATIONS: 87 y.o. female was brought to the [facility] Emergency Room following a complaint of  pain in the hip. The patient was evaluated in the emergency room and x-rays confirmed the presence of a displaced intracapsular femoral neck fracture. As I was on call for the emergency room, I was consulted to evaluate and manage this fracture. The patient has been admitted to the hospital internist to optimize secondary to his multiple medical comorbidities. The patient's history was reviewed and preoperative medications were reviewed specifically for anticoagulants. A risk assessment was made for any recent DVT or PE and infection risk.  The patient had elevated INR on admission.  Secondary to being Coumadin due to her atrial fibrillation.  Her Coumadin was held and she  received vitamin K and FFP.  This normalized her INR to 1.39.    The patient's options and alternatives were discussed in detail with the patient and her son family members present. The patient was indicated for a partial hip replacement. The patient elected to proceed with a hip endoprosthesis. Likely risks and benefits of the procedure including, but not limited to infection, DVT, pulmonary embolism, leg length discrepancy, recurrent dislocation, periprosthetic fractures, possibility of injury to nerves or vessels, risk for cardiac events, MI, stroke, post operative delirium,  mortality, morbidity, and immobility syndrome have been discussed in detail. Despite the risks involved, the patient and family elected to proceed. An informed consent has been obtained, and patient  has been scheduled for surgery as a semi-emergency. The patient has been seen and evaluated by the admitting service for this hip surgery. The patient has been cleared with the risk to the operating room.  Informed consent was obtained and the operative site was marked.      DESCRIPTION OF PROCEDURE: The patient has been transferred to [facility] Operating Room. Preoperative antibiotics were given in the form of Kefzol 2 gm IV according to SCIP protocol prior to the incision.  After achieving adequate general anesthesia, the patient was placed in the lateral position utilizing a pegboard. All bony prominences were padded well. The operative hip was prepped and draped in the usual sterile fashion. Surgical time-out was done. Correct patient, surgical side and site were identified. Tranexamic acid was given topically into the wound.      A skin incision was made centering over the greater trochanter for a posterior approach. Skin and subcutaneous tissue were incised and the deep fascia was incised in line with the skin incision. The gluteus reid muscle fibers were split in their direction. Posterior aspect of the hip joint was  identified. An L-shaped arthrotomy was made along the superior border of the pyriformis tendon and along the posterior aspect of the greater trochanter. The capsule was released. There was a hemarthrosis. This was evacuated. Femoral neck fracture was identified. The femoral neck was trimmed at the lowest portion of the fracture site utilizing a reciprocating saw. Followed by this, the femoral head was extracted with a corkscrew. The femoral head measured 46 mm in its outer diameter. The acetabular cavity was inspected and bone debris was cleared from the cavity. The cartilage was found to be satisfactory. The labrum was intact. It was planned to proceed with the endoprosthesis. A trial femoral head was seated and there was a good suction fit.  The femoral neck was elevated with the help of a neck elevating [place] retractor and proximal femur was entered with a box chisel, followed by canal finder, followed by serial broaching. Adequate fit was found to be obtained with a size 3 broach.Trial reduction was performed. Reduction was found to be satisfactory with good restoration of leg lengths.  Range of motion was checked and there was excellent range of motion with good stability throughout the range for flexion, adduction , internal rotation and external rotation. There was no sign of impingement.  The trial was dislocated and the Accolade broach was replaced with a size 3 evolve femoral stem, checked for stability. The bipolar head 46 mm outer diameter with a standard neck sleeve was seated into position, checked again for stability and the hip was reduced. Reduction was found to be satisfactory. The hip joint was thoroughly irrigated with saline and soft tissue hemostasis was secured. The posterior capsule was re-anchored to the greater trochanter with the help of FiberWire sutures and drill holes made into the greater trochanter. The sponge count and needle count was found to be correct. The incision was closed  in layers with Ethibond, vicryl and staples. Sterile dressings were placed and the patient was transferred to the recovery room in a stable condition.      The patient prior to closure received periarticular injection of  Naropin, clonidine, and ketorolac mixture. The patient tolerated the procedure well and had adequate distal pulses and good capillary refill. The patient was then transferred to the recovery room and then later to the floor without any complications. The patient will receive postoperative antibiotics in the form of Kefzol IV q.8 h.within the first 24 hours for 2 more doses according to SCIP protocol.  Coumadin home dose DVT prophylaxis will begin in the morning.    I discussed the satisfactory performance of the procedure with the patient's family and discussed with them The postoperative management.      Iam Greene M.D.*    8/18/2017    cc:  Guzman Watkins MD; MD Kye Cruz MD

## 2017-08-18 NOTE — ANESTHESIA PROCEDURE NOTES
Airway  Urgency: elective    Date/Time: 8/18/2017 1:59 PM  Airway not difficult    General Information and Staff    Patient location during procedure: OR  Anesthesiologist: NEGRITA BETANCUR  CRNA: CECILIA KIM    Indications and Patient Condition  Indications for airway management: airway protection    Preoxygenated: yes  Mask difficulty assessment: 1 - vent by mask    Final Airway Details  Final airway type: endotracheal airway      Successful airway: ETT  Cuffed: yes   Successful intubation technique: direct laryngoscopy  Endotracheal tube insertion site: oral  Blade: Dontae  Blade size: #3  ETT size: 7.0 mm  Cormack-Lehane Classification: grade I - full view of glottis  Placement verified by: chest auscultation and capnometry   Cuff volume (mL): 5  Measured from: lips  ETT to lips (cm): 20  Number of attempts at approach: 1    Additional Comments  Smooth IV induction. Trachea intubated. Cuff up. Ett secured. BEBS. Dentition intact without injury.

## 2017-08-18 NOTE — PLAN OF CARE
Problem: Patient Care Overview (Adult)  Goal: Plan of Care Review  Outcome: Ongoing (interventions implemented as appropriate)    08/18/17 1252   Coping/Psychosocial Response Interventions   Plan Of Care Reviewed With patient   Patient Care Overview   Progress no change   Outcome Evaluation   Outcome Summary/Follow up Plan preparing for surgery         Problem: Perioperative Period (Adult)  Goal: Signs and Symptoms of Listed Potential Problems Will be Absent or Manageable (Perioperative Period)  Outcome: Ongoing (interventions implemented as appropriate)    08/18/17 1252   Perioperative Period   Problems Assessed (Perioperative Period) pain;infection;situational response   Problems Present (Perioperative Period) pain

## 2017-08-18 NOTE — PROGRESS NOTES
Continued Stay Note  [facility]     Patient Name: Rosetta Hemphill  MRN: [id number]  Today's Date: 8/18/2017    Admit Date: 8/16/2017          Discharge Plan       08/18/17 0900    Case Management/Social Work Plan    Plan [facility]    Additional Comments CCP called [facility] ([phone]) and spoke with Alva who confirms they did recieve fax yesterday and they will call CCP back with referral outcome. Alva does state they do have beds and Crystal the DON at the center is aware. Pt to have surgery today, do not anticipate dc over the weekend. Packet started in CCP office. Everardo MENESES/CCP              Discharge Codes     None            Hollie Elizabeth, RN

## 2017-08-18 NOTE — PROGRESS NOTES
"DAILY PROGRESS NOTE  [facility]    Patient Identification:  Name: Rosetta Hemphill  Age: 87 y.o.  Sex: female  :  1930  MRN: [id number]         Primary Care Physician: Guzman Watkins MD    Subjective:  Interval History:Sleepy. Post op    Objective:    Scheduled Meds:    amLODIPine 5 mg Oral Daily   [MAR Hold] aspirin 81 mg Oral Daily   [MAR Hold] atorvastatin 80 mg Oral Daily   carvedilol 12.5 mg Oral BID With Meals   gabapentin 100 mg Oral Daily   [MAR Hold] levothyroxine 75 mcg Oral Q AM   metoprolol tartrate 25 mg Oral BID   [MAR Hold] montelukast 10 mg Oral Nightly   [MAR Hold] pantoprazole 40 mg Oral Q AM   potassium chloride 10 mEq Oral Daily     Continuous Infusions:    lactated ringers 9 mL/hr Last Rate: 9 mL/hr (17 1332)       Vital signs in last 24 hours:  Temp:  [97.6 °F (36.4 °C)-98.3 °F (36.8 °C)] 97.7 °F (36.5 °C)  Heart Rate:  [68-80] 76  Resp:  [16-20] 16  BP: (119-180)/(66-91) 180/85    Intake/Output:    Intake/Output Summary (Last 24 hours) at 17 1554  Last data filed at 17 1530   Gross per 24 hour   Intake             1090 ml   Output              100 ml   Net              990 ml       Exam:  /85 (BP Location: Right arm, Patient Position: Lying)  Pulse 76  Temp 97.7 °F (36.5 °C) (Oral)   Resp 16  Ht 58\" (147.3 cm)  Wt 115 lb 9.6 oz (52.4 kg)  SpO2 95%  BMI 24.16 kg/m2    General Appearance:   sleepy, no distress   Head:    Normocephalic, without obvious abnormality, atraumatic   Eyes:       Throat:   Lips, tongue, gums normal   Neck:   Supple, symmetrical, trachea midline, no JVD   Lungs:     Clear to auscultation bilaterally, respirations unlabored   Chest Wall:    No tenderness or deformity    Heart:    Regular rate and rhythm, S1 and S2 normal, no murmur,no  Rub or gallop   Abdomen:     Soft, non-tender, bowel sounds active, no masses, no organomegaly    Extremities:   Extremities normal, surgical changes left hip, no cyanosis or edema "   Pulses:      Skin:   Skin is warm and dry,  no rashes or palpable lesions   Neurologic:    sleepy      [unfilled]  Data Review:    Results from last 7 days  Lab Units 08/18/17  0531 08/17/17  0508 08/16/17 2015   SODIUM mmol/L 135* 137 139   POTASSIUM mmol/L 4.1 4.0 4.3   CHLORIDE mmol/L 98 100 105   CO2 mmol/L 24.3 21.1* 19.8*   BUN mg/dL 18 17 23   CREATININE mg/dL 0.75 0.72 0.88   GLUCOSE mg/dL 102* 109* 104*   CALCIUM mg/dL 8.9 9.3 9.3       Results from last 7 days  Lab Units 08/18/17  0531 08/17/17  0508 08/16/17 2015   WBC 10*3/mm3 6.69 7.55 4.88   HEMOGLOBIN g/dL 9.6* 10.8* 10.1*   HEMATOCRIT % 29.3* 33.7* 32.4*   PLATELETS 10*3/mm3 169 214 192       Results from last 7 days  Lab Units 08/17/17  0508   TSH mIU/mL 10.390*           Lab Results  Lab Value Date/Time   TROPONINT <0.010 08/17/2017 0508           Results from last 7 days  Lab Units 08/17/17  0508   ALK PHOS U/L 99   BILIRUBIN mg/dL 0.5   ALT (SGPT) U/L 25   AST (SGOT) U/L 32       Results from last 7 days  Lab Units 08/17/17  0508   TSH mIU/mL 10.390*         Glucose   Date/Time Value Ref Range Status   08/17/2017 0657 110 70 - 130 mg/dL Final       Results from last 7 days  Lab Units 08/18/17  0531 08/17/17  0508 08/17/17  0052   INR  1.39* 1.88* 1.86*       Patient Active Problem List   Diagnosis Code   • Subcapital fracture of hip S72.019A   • Atrial fibrillation I48.91   • HX: long term anticoagulant use Z79.01   • Hypothyroidism E03.9   • HTN (hypertension) I10       Assessment:  Principal Problem:    Subcapital fracture of hip  Active Problems:    Atrial fibrillation    HX: long term anticoagulant use    Hypothyroidism    HTN (hypertension)      Plan:  Post op care. Coumadin on hold.    Kye Tillman MD  8/18/2017  3:54 PM

## 2017-08-18 NOTE — PLAN OF CARE
Problem: Patient Care Overview (Adult)  Goal: Plan of Care Review  Outcome: Ongoing (interventions implemented as appropriate)    08/18/17 0555   Coping/Psychosocial Response Interventions   Plan Of Care Reviewed With patient   Patient Care Overview   Progress no change   Outcome Evaluation   Outcome Summary/Follow up Plan pt resting well. NPO for am surgery. VSS. pain well controlled. will continue to monitor.

## 2017-08-18 NOTE — PLAN OF CARE
Problem: Patient Care Overview (Adult)  Goal: Plan of Care Review  Outcome: Ongoing (interventions implemented as appropriate)    08/18/17 1333   Coping/Psychosocial Response Interventions   Plan Of Care Reviewed With patient;son   Patient Care Overview   Progress no change   Outcome Evaluation   Outcome Summary/Follow up Plan VSS, no falls, pain controlled. Morphine given effectively x 1. Down for hip endoprosthesis now. Son at bedside for part of shift. WCM patient.        Goal: Adult Individualization and Mutuality  Outcome: Ongoing (interventions implemented as appropriate)  Goal: Discharge Needs Assessment  Outcome: Ongoing (interventions implemented as appropriate)    Problem: Fall Risk (Adult)  Goal: Identify Related Risk Factors and Signs and Symptoms  Outcome: Ongoing (interventions implemented as appropriate)  Goal: Absence of Falls  Outcome: Ongoing (interventions implemented as appropriate)    Problem: Pain, Acute (Adult)  Goal: Identify Related Risk Factors and Signs and Symptoms  Outcome: Ongoing (interventions implemented as appropriate)  Goal: Acceptable Pain Control/Comfort Level  Outcome: Ongoing (interventions implemented as appropriate)    Problem: Anxiety (Adult)  Goal: Identify Related Risk Factors and Signs and Symptoms  Outcome: Ongoing (interventions implemented as appropriate)    Problem: Fractured Hip (Adult)  Goal: Signs and Symptoms of Listed Potential Problems Will be Absent or Manageable (Fractured Hip)  Outcome: Ongoing (interventions implemented as appropriate)    Problem: Perioperative Period (Adult)  Goal: Signs and Symptoms of Listed Potential Problems Will be Absent or Manageable (Perioperative Period)  Outcome: Ongoing (interventions implemented as appropriate)

## 2017-08-18 NOTE — ANESTHESIA PREPROCEDURE EVALUATION
Anesthesia Evaluation     NPO Solid Status: > 8 hours       Airway   Mallampati: II  no difficulty expected  Dental - normal exam   (+) edentulous    Pulmonary     breath sounds clear to auscultation  (+) asthma,   Cardiovascular     ECG reviewed  Patient on routine beta blocker  Rhythm: regular  Rate: normal    (+) pacemaker pacemaker, hypertension, dysrhythmias, murmur, hyperlipidemia      Neuro/Psych  (+) CVA,    GI/Hepatic/Renal/Endo    (+) obesity,  GERD, hypothyroidism,     Musculoskeletal     Abdominal    Substance History      OB/GYN          Other                                        Anesthesia Plan    ASA 3     general     intravenous induction   Anesthetic plan and risks discussed with patient.    Plan discussed with CRNA.

## 2017-08-18 NOTE — BRIEF OP NOTE
HIP ENDOPROSTHESIS  Procedure Note    Rosetta Hemphill  8/16/2017 - 8/18/2017    Pre-op Diagnosis:   Subcapital fracture of hip, left, closed, initial encounter [S72.012A]    Post-op Diagnosis:     Post-Op Diagnosis Codes:     * Subcapital fracture of hip, left, closed, initial encounter [S72.012A]    Procedure/CPT® Codes:      Procedure(s):  PARTIAL HIP REPLACEMENT    Surgeon(s):  Iam Greene MD    Anesthesia: General    Staff:   Circulator: Lien Braga RN  Scrub Person: Kenny Bennett RN; Roland Flores  Assistant: FUENTES Littlejohn    Estimated Blood Loss: 100 mL  Urine Voided: * No values recorded between 8/18/2017  1:47 PM and 8/18/2017  3:39 PM *    Specimens:                * No specimens in log *      Drains:           Findings: see dict     Complications: markie Greene MD     Date: 8/18/2017  Time: 3:46 PM

## 2017-08-18 NOTE — PROGRESS NOTES
Continued Stay Note  [facility]     Patient Name: Rosetta Hemphill  MRN: [id number]  Today's Date: 8/18/2017    Admit Date: 8/16/2017          Discharge Plan       08/18/17 1717    Case Management/Social Work Plan    [facility]    Additional Comments [facility] spoke with Charmaine RIOJAS at [facility] and she states patient has been accepted and pt will have co-pay of 164.50 a day and son is aware of that. [facility] did mention pt had secondary insurance and she will check his benefits. Charmaine states she will follow up with [facility] on Monday. Packet in [facility] office. Everardo MENESES/CCP              Discharge Codes     None            Hollie Elizabeth, RN

## 2017-08-19 NOTE — PROGRESS NOTES
"DAILY PROGRESS NOTE  [facility]    Patient Identification:  Name: Rosetta Hemphill  Age: 87 y.o.  Sex: female  :  1930  MRN: [id number]         Primary Care Physician: Guzman Watkins MD    Subjective:  Interval History:Sleepy. Some hip pain.    Objective:    Scheduled Meds:    amLODIPine 5 mg Oral Daily   aspirin 81 mg Oral Daily   atorvastatin 80 mg Oral Daily   carvedilol 12.5 mg Oral BID With Meals   gabapentin 100 mg Oral Daily   levothyroxine 75 mcg Oral Q AM   metoprolol tartrate 25 mg Oral BID   montelukast 10 mg Oral Nightly   pantoprazole 40 mg Oral Q AM   warfarin 5 mg Oral Daily     Continuous Infusions:    sodium chloride 75 mL/hr Last Rate: 75 mL/hr (17 0745)       Vital signs in last 24 hours:  Temp:  [97.4 °F (36.3 °C)-98 °F (36.7 °C)] 98 °F (36.7 °C)  Heart Rate:  [68-80] 70  Resp:  [14-16] 16  BP: ()/(37-85) 109/63    Intake/Output:    Intake/Output Summary (Last 24 hours) at 17 1425  Last data filed at 17 0755   Gross per 24 hour   Intake             1830 ml   Output              400 ml   Net             1430 ml       Exam:  /63 (BP Location: Right arm, Patient Position: Lying)  Pulse 70  Temp 98 °F (36.7 °C) (Oral)   Resp 16  Ht 58\" (147.3 cm)  Wt 125 lb (56.7 kg)  SpO2 97%  BMI 26.13 kg/m2    General Appearance:    no distress   Head:    Normocephalic, without obvious abnormality, atraumatic   Eyes:       Throat:   Lips, tongue, gums normal   Neck:   Supple, symmetrical, trachea midline, no JVD   Lungs:     Clear to auscultation bilaterally, respirations unlabored   Chest Wall:    No tenderness or deformity    Heart:    Regular rate and rhythm, S1 and S2 normal, no murmur,no  Rub or gallop   Abdomen:     Soft, non-tender, bowel sounds active, no masses, no organomegaly    Extremities:   Extremities normal, surgical changes left hip, no cyanosis or edema   Pulses:      Skin:   Skin is warm and dry,  no rashes or palpable lesions "   Neurologic:    No focal deficits      [unfilled]  Data Review:    Results from last 7 days  Lab Units 08/19/17  0443 08/18/17  0531 08/17/17  0508   SODIUM mmol/L 135* 135* 137   POTASSIUM mmol/L 5.3* 4.1 4.0   CHLORIDE mmol/L 100 98 100   CO2 mmol/L 21.6* 24.3 21.1*   BUN mg/dL 23 18 17   CREATININE mg/dL 0.91 0.75 0.72   GLUCOSE mg/dL 115* 102* 109*   CALCIUM mg/dL 9.5 8.9 9.3       Results from last 7 days  Lab Units 08/19/17  0443 08/18/17  0531 08/17/17  0508   WBC 10*3/mm3 9.16 6.69 7.55   HEMOGLOBIN g/dL 9.2* 9.6* 10.8*   HEMATOCRIT % 28.5* 29.3* 33.7*   PLATELETS 10*3/mm3 148 169 214       Results from last 7 days  Lab Units 08/17/17  0508   TSH mIU/mL 10.390*           Lab Results  Lab Value Date/Time   TROPONINT <0.010 08/17/2017 0508           Results from last 7 days  Lab Units 08/17/17  0508   ALK PHOS U/L 99   BILIRUBIN mg/dL 0.5   ALT (SGPT) U/L 25   AST (SGOT) U/L 32       Results from last 7 days  Lab Units 08/17/17  0508   TSH mIU/mL 10.390*         Glucose   Date/Time Value Ref Range Status   08/17/2017 0657 110 70 - 130 mg/dL Final       Results from last 7 days  Lab Units 08/19/17  0443 08/18/17  1715 08/18/17  0531   INR  1.29* 1.34* 1.39*       Patient Active Problem List   Diagnosis Code   • Subcapital fracture of hip S72.019A   • Atrial fibrillation I48.91   • HX: long term anticoagulant use Z79.01   • Hypothyroidism E03.9   • HTN (hypertension) I10       Assessment:  Principal Problem:    Subcapital fracture of hip  Active Problems:    Atrial fibrillation    HX: long term anticoagulant use    Hypothyroidism    HTN (hypertension)      Plan:  Post op care. Restart Coumadin. Will need SNU for rehab.    Kye Tillman MD  8/19/2017  2:25 PM

## 2017-08-19 NOTE — PROGRESS NOTES
Orthopedic Progress Note      Patient: Rosetta Hemphill  YOB: 1930     Date of Admission: 8/16/2017  7:41 PM Medical Record Number: [id number]     Attending Physician: Kye Tillman MD    Status Post:  IA PARTIAL HIP REPLACEMENT [[id number]] (HIP ENDOPROSTHESIS) Post Operative Day Number: 1    Subjective : No new orthopaedic complaints     Pain Relief: some relief with present medication.     Systemic Complaints: No Complaints  Vitals:    08/19/17 0156 08/19/17 0337 08/19/17 0500 08/19/17 0700   BP:  133/63  109/63   BP Location:  Right arm  Right arm   Patient Position:  Lying  Lying   Pulse: 70 68  70   Resp:  16  16   Temp:    98 °F (36.7 °C)   TempSrc:    Oral   SpO2: 96% 96%  97%   Weight:   125 lb (56.7 kg)    Height:           Physical Exam: 87 y.o. female    General Appearance:       Alert, cooperative, in no acute distress                  Extremities:    Dressing Clean, Dry and Intact         Incision healthy without signs or symptoms of infections         No clinical sign of DVT        Able to do good movements of digits    Pulses:   Pulses palpable and equal bilaterally           Diagnostic Tests:       Results from last 7 days  Lab Units 08/19/17  0443 08/18/17  0531 08/17/17  0508   WBC 10*3/mm3 9.16 6.69 7.55   HEMOGLOBIN g/dL 9.2* 9.6* 10.8*   HEMATOCRIT % 28.5* 29.3* 33.7*   PLATELETS 10*3/mm3 148 169 214       Results from last 7 days  Lab Units 08/19/17  0443 08/18/17  0531 08/17/17  0508   SODIUM mmol/L 135* 135* 137   POTASSIUM mmol/L 5.3* 4.1 4.0   CHLORIDE mmol/L 100 98 100   CO2 mmol/L 21.6* 24.3 21.1*   BUN mg/dL 23 18 17   CREATININE mg/dL 0.91 0.75 0.72   GLUCOSE mg/dL 115* 102* 109*   CALCIUM mg/dL 9.5 8.9 9.3       Results from last 7 days  Lab Units 08/19/17  0443 08/18/17  1715 08/18/17  0531   INR  1.29* 1.34* 1.39*     No results found for: CRYSTAL]  No results found for: CULTURE]  No results found for: URICACID]  Ct Head Without Contrast    Result Date: 8/17/2017  Narrative: CT  HEAD WITHOUT CONTRAST  HISTORY: Slurred speech, confusion.  TECHNIQUE: A noncontrasted CT examination of the brain was performed and compared to the CT examination of 08/16/2017.  FINDINGS: Again identified are areas of decreased attenuation involving the right parietal and occipital lobes posterolaterally extending to the superior and posterior aspect of the right temporal lobe suggesting an acute to subacute infarct. Smaller areas of decreased attenuation involving the right frontal lobe laterally and involving the insular cortex on the right are appreciated also present previously and unchanged. No new area of decreased attenuation to suggest acute infarction is identified. There is no evidence of hemorrhage. Further evaluation could be performed with a MRI examination of the brain as indicated.  The above information was called to the patient's nurse at time of the dictation. The patient's nurse is to immediately relay the information to the clinical service.  This report was finalized on 8/17/2017 12:49 PM by Dr. Mika Delgadillo MD.      Ct Head Without Contrast    Result Date: 8/17/2017  Narrative: CT HEAD WITHOUT CONTRAST  HISTORY: Fall, hit head, headache, on Coumadin.  COMPARISON: None.  FINDINGS: Bone windows showed no evidence of a calvarial fracture. The brain ventricles are symmetrical. There is moderate atrophy. Moderate facet calcification is noted.  An air-fluid level is present in the right maxillary sinus with moderate mucosal thickening involving the right maxillary sinus present.  There is an area decreased attenuation involving the right parietal lobe laterally suspicious for a moderate size acute to subacute infarct. This measures approximately 5 cm in AP dimension. A smaller area of decreased attenuation is appreciated involving the cortex of the right frontal lobe superiolaterally measuring approximately 1.2 cm in transverse dimension. There is no evidence of hemorrhage. A small scalp hematoma  is noted in the left parietal region.  Further evaluation could be performed with MRI examination of the brain as indicated.  The above information was called to and discussed with Dr. Patiño.  This report was finalized on 8/17/2017 6:57 AM by Dr. Mika Delgadillo MD.      Xr Hip With Or Without Pelvis 1 View Left    Result Date: 8/18/2017  Narrative: XR HIP W OR WO PELVIS 1 VIEW LEFT-  INDICATIONS: Postoperative evaluation.  TECHNIQUE:     Frontal views of the pelvis and left hip  COMPARISON: None available  FINDINGS:   Intact appearing left proximal femoral hemiarthroplasty hardware is seen with adjacent surgical soft tissue gas,  overlying skin staples. No acute fracture is identified.       Impression:  Postsurgical changes.    This report was finalized on 8/18/2017 4:28 PM by Dr. Jac Peter MD.      Xr Hip With Or Without Pelvis 2 - 3 View Left    Result Date: 8/17/2017  Narrative: PELVIS AND LEFT HIP  HISTORY fall, pain.  FINDINGS: An AP view of the pelvis as well as AP and crosstable lateral views of the left hip demonstrates a subcapital fracture on the left. There is no evidence of dislocation. There is moderate displacement. The above information was called to and discussed with Dr. Patiño.  This report was finalized on 8/17/2017 6:57 AM by Dr. Mika Delgadillo MD.          Current Medications:  Scheduled Meds:  amLODIPine 5 mg Oral Daily   aspirin 81 mg Oral Daily   atorvastatin 80 mg Oral Daily   carvedilol 12.5 mg Oral BID With Meals   gabapentin 100 mg Oral Daily   levothyroxine 75 mcg Oral Q AM   metoprolol tartrate 25 mg Oral BID   montelukast 10 mg Oral Nightly   pantoprazole 40 mg Oral Q AM   warfarin 5 mg Oral Daily     Continuous Infusions:  sodium chloride 75 mL/hr Last Rate: 75 mL/hr (08/19/17 0745)     PRN Meds:.bisacodyl  •  docusate sodium  •  fluticasone  •  HYDROcodone-acetaminophen  •  Morphine  •  ondansetron  •  sodium chloride  •  Insert peripheral IV **AND** sodium  chloride    Assessment: Status post  AK PARTIAL HIP REPLACEMENT [86383] (HIP ENDOPROSTHESIS)    Patient Active Problem List   Diagnosis   • Subcapital fracture of hip   • Atrial fibrillation   • HX: long term anticoagulant use   • Hypothyroidism   • HTN (hypertension)       PLAN:   Continues current post-op course  Anticoagulation: Coumadin started   Dressing Change in am  Mobilize with PT as tolerated per protocol    Weight Bearing: WBAT  Discharge Plan: OK to plan for discharge in  tomorrow to SNF  from orthopadic perspective.    Iam Greene MD    Date: 8/19/2017    Time: 11:29 AM

## 2017-08-19 NOTE — DISCHARGE INSTR - LAB
The patient needs to have the staples in her left hip removed 8/29/17 at Dr. Greene's office.    The patient needs to follow up with Dr. Greene, as an office visit, on 9/13/17.

## 2017-08-19 NOTE — SIGNIFICANT NOTE
08/19/17 1310   Rehab Treatment   Discipline physical therapist   Rehab Evaluation   Evaluation Not Performed other (see comments)  (RN requested to hold PT for today secondary to pt demonstrating increased confusion. PT will f/u tomorrow. )

## 2017-08-19 NOTE — PLAN OF CARE
Problem: Patient Care Overview (Adult)  Goal: Plan of Care Review  Outcome: Ongoing (interventions implemented as appropriate)    08/19/17 0235   Coping/Psychosocial Response Interventions   Plan Of Care Reviewed With patient   Patient Care Overview   Progress progress toward functional goals as expected   Outcome Evaluation   Outcome Summary/Follow up Plan meds per order, pain meds per request, no falls, see labs and vs       Goal: Adult Individualization and Mutuality  Outcome: Ongoing (interventions implemented as appropriate)  Goal: Discharge Needs Assessment  Outcome: Ongoing (interventions implemented as appropriate)    Problem: Fall Risk (Adult)  Goal: Identify Related Risk Factors and Signs and Symptoms  Outcome: Outcome(s) achieved Date Met:  08/19/17  Goal: Absence of Falls  Outcome: Ongoing (interventions implemented as appropriate)    Problem: Pain, Acute (Adult)  Goal: Identify Related Risk Factors and Signs and Symptoms  Outcome: Outcome(s) achieved Date Met:  08/19/17  Goal: Acceptable Pain Control/Comfort Level  Outcome: Ongoing (interventions implemented as appropriate)    Problem: Anxiety (Adult)  Goal: Identify Related Risk Factors and Signs and Symptoms  Outcome: Outcome(s) achieved Date Met:  08/19/17  Goal: Reduction/Resolution  Outcome: Ongoing (interventions implemented as appropriate)    Problem: Fractured Hip (Adult)  Goal: Signs and Symptoms of Listed Potential Problems Will be Absent or Manageable (Fractured Hip)  Outcome: Ongoing (interventions implemented as appropriate)

## 2017-08-20 PROBLEM — D64.9 ANEMIA: Status: ACTIVE | Noted: 2017-01-01

## 2017-08-20 NOTE — PLAN OF CARE
Problem: Patient Care Overview (Adult)  Goal: Plan of Care Review  Outcome: Ongoing (interventions implemented as appropriate)    Problem: Inpatient Physical Therapy  Goal: Bed Mobility Goal LTG- PT    08/20/17 1125   Bed Mobility PT LTG   Bed Mobility PT LTG, Date Established 08/20/17   Bed Mobility PT LTG, Time to Achieve 1 wk   Bed Mobility PT LTG, Activity Type all bed mobility   Bed Mobility PT LTG, [place] Level minimum assist (75% patient effort)       Goal: Transfer Training Goal 1 LTG- PT    08/20/17 1125   Transfer Training PT LTG   Transfer Training PT LTG, Date Established 08/20/17   Transfer Training PT LTG, Activity Type all transfers   Transfer Training PT LTG, [place] Level minimum assist (75% patient effort)   Transfer Training PT LTG, Assist Device walker, rolling       Goal: Gait Training Goal LTG- PT    08/20/17 1125   Gait Training PT LTG   Gait Training Goal PT LTG, Date Established 08/20/17   Gait Training Goal PT LTG, Time to Achieve 1 wk   Gait Training Goal PT LTG, [place] Level minimum assist (75% patient effort)   Gait Training Goal PT LTG, Assist Device walker, rolling   Gait Training Goal PT LTG, Distance to Achieve 25 ft

## 2017-08-20 NOTE — PLAN OF CARE
Problem: Fall Risk (Adult)  Goal: Absence of Falls  Outcome: Ongoing (interventions implemented as appropriate)    Problem: Pain, Acute (Adult)  Goal: Acceptable Pain Control/Comfort Level  Outcome: Ongoing (interventions implemented as appropriate)    Problem: Anxiety (Adult)  Goal: Reduction/Resolution  Outcome: Ongoing (interventions implemented as appropriate)    Problem: Fractured Hip (Adult)  Goal: Signs and Symptoms of Listed Potential Problems Will be Absent or Manageable (Fractured Hip)  Outcome: Ongoing (interventions implemented as appropriate)

## 2017-08-20 NOTE — PLAN OF CARE
Problem: Patient Care Overview (Adult)  Goal: Plan of Care Review    08/20/17 1125   Outcome Evaluation   Outcome Summary/Follow up Plan Pt was confused throughout evaluation and was inconsistent with answering questions/following directions. She was able to perform bed mobility with Max x 1 and stand at EOB with use of RWx and Mod x 1. Pt required verbal/tactile cues throughout entire evaluation. She was able to tolerate standing at EOB and demonstrate WBAT LLE, for about 15 seconds before needing to sit secondary to pain in LLE. Sit-stand was performed twice, prior to being repositioned back in bed. Pt was unable to take steps today secondary to confused state and pain in the LLE. Pt and her son were educated on posterior hip precautions along with WBing status, with son able to verbalize his understanding. Pt will benefit from continuing skilled PT at this time in order to promote WBing and to improve independence with functional mobility and decrease falls risk. PT recommends d/c to SNF when appropriate.

## 2017-08-20 NOTE — PROGRESS NOTES
"DAILY PROGRESS NOTE  [facility]    Patient Identification:  Name: Rosetta Hemphill  Age: 87 y.o.  Sex: female  :  1930  MRN: [id number]         Primary Care Physician: Guzman Watkins MD    Subjective:  Interval History:Sleepy. Some hip pain.  Stood up with PT.    Objective:    Scheduled Meds:    amLODIPine 5 mg Oral Daily   aspirin 81 mg Oral Daily   atorvastatin 80 mg Oral Daily   carvedilol 12.5 mg Oral BID With Meals   gabapentin 100 mg Oral Daily   levothyroxine 75 mcg Oral Q AM   metoprolol tartrate 25 mg Oral BID   montelukast 10 mg Oral Nightly   pantoprazole 40 mg Oral Q AM   warfarin 5 mg Oral Daily     Continuous Infusions:    sodium chloride 75 mL/hr Last Rate: 75 mL/hr (17 0559)       Vital signs in last 24 hours:  Temp:  [97.4 °F (36.3 °C)-98.3 °F (36.8 °C)] 98.3 °F (36.8 °C)  Heart Rate:  [66-77] 66  Resp:  [18] 18  BP: (127-133)/(57-98) 129/57    Intake/Output:    Intake/Output Summary (Last 24 hours) at 17 1447  Last data filed at 17 1300   Gross per 24 hour   Intake               90 ml   Output              400 ml   Net             -310 ml       Exam:  /57 (BP Location: Left arm, Patient Position: Lying)  Pulse 66  Temp 98.3 °F (36.8 °C) (Oral)   Resp 18  Ht 58\" (147.3 cm)  Wt 139 lb (63 kg)  SpO2 98%  BMI 29.05 kg/m2    General Appearance:    no distress   Head:    Normocephalic, without obvious abnormality, atraumatic   Eyes:       Throat:   Lips, tongue, gums normal   Neck:   Supple, symmetrical, trachea midline, no JVD   Lungs:     Clear to auscultation bilaterally, respirations unlabored   Chest Wall:    No tenderness or deformity    Heart:    Regular rate and rhythm, S1 and S2 normal, no murmur,no  Rub or gallop   Abdomen:     Soft, non-tender, bowel sounds active, no masses, no organomegaly    Extremities:   Extremities normal, surgical changes left hip, no cyanosis or edema   Pulses:      Skin:   Skin is warm and dry,  no rashes or palpable " lesions   Neurologic:    No focal deficits      [unfilled]  Data Review:    Results from last 7 days  Lab Units 08/20/17  0614 08/19/17  0443 08/18/17  0531   SODIUM mmol/L 134* 135* 135*   POTASSIUM mmol/L 3.8 5.3* 4.1   CHLORIDE mmol/L 101 100 98   CO2 mmol/L 17.4* 21.6* 24.3   BUN mg/dL 25* 23 18   CREATININE mg/dL 0.64 0.91 0.75   GLUCOSE mg/dL 98 115* 102*   CALCIUM mg/dL 8.4* 9.5 8.9       Results from last 7 days  Lab Units 08/20/17  0614 08/19/17  0443 08/18/17  0531   WBC 10*3/mm3 5.40 9.16 6.69   HEMOGLOBIN g/dL 7.9* 9.2* 9.6*   HEMATOCRIT % 23.7* 28.5* 29.3*   PLATELETS 10*3/mm3 129* 148 169       Results from last 7 days  Lab Units 08/17/17  0508   TSH mIU/mL 10.390*           Lab Results  Lab Value Date/Time   TROPONINT <0.010 08/17/2017 0508           Results from last 7 days  Lab Units 08/17/17  0508   ALK PHOS U/L 99   BILIRUBIN mg/dL 0.5   ALT (SGPT) U/L 25   AST (SGOT) U/L 32       Results from last 7 days  Lab Units 08/17/17  0508   TSH mIU/mL 10.390*         No results found for: POCGLU    Results from last 7 days  Lab Units 08/20/17  0614 08/19/17  0443 08/18/17  1715   INR  1.49* 1.29* 1.34*       Patient Active Problem List   Diagnosis Code   • Subcapital fracture of hip S72.019A   • Atrial fibrillation I48.91   • HX: long term anticoagulant use Z79.01   • Hypothyroidism E03.9   • HTN (hypertension) I10   • Anemia D64.9       Assessment:  Principal Problem:    Subcapital fracture of hip  Active Problems:    Atrial fibrillation    HX: long term anticoagulant use    Hypothyroidism    HTN (hypertension)    Anemia      Plan:  Post op care. Continue Coumadin. Will need SNU for rehab. Transfuse one unit PRBC.    Kye Tillman MD  8/20/2017  2:47 PM

## 2017-08-20 NOTE — THERAPY EVALUATION
Acute Care - Physical Therapy Initial Evaluation  [facility]     Patient Name: Rosetta Hemphill  : 1930  MRN: [id number]  Today's Date: 2017                Admit Date: 2017     Visit Dx:    ICD-10-CM ICD-9-CM   1. Subcapital fracture of hip, left, closed, initial encounter S72.012A 820.09   2. Closed head injury, initial encounter S09.90XA 959.01   3. Fall, initial encounter W19.XXXA E888.9   4. Warfarin-induced coagulopathy T45.511A 286.9    D68.9 E934.2   5. Difficulty walking R26.2 719.7     Patient Active Problem List   Diagnosis   • Subcapital fracture of hip   • Atrial fibrillation   • HX: long term anticoagulant use   • Hypothyroidism   • HTN (hypertension)     Past Medical History:   Diagnosis Date   • Arthritis    • Asthma    • Atrial fibrillation    • Disease of thyroid gland    • GERD (gastroesophageal reflux disease)    • Hyperlipidemia    • Hypertension    • Stroke      History reviewed. No pertinent surgical history.       PT ASSESSMENT (last 72 hours)      PT Evaluation       17 1047 17 1310    Rehab Evaluation    Document Type evaluation  -DO     Subjective Information complains of;pain;agree to therapy   Pt's son agreeable to therapy; pt confused secondary to meds  -DO     Evaluation Not Performed  other (see comments)   RN requested to hold PT for today secondary to pt demonstrating increased confusion. PT will f/u tomorrow.   -DO    Patient Effort, Rehab Treatment fair  -DO     Symptoms Noted During/After Treatment increased pain  -DO     Symptoms Noted Comment Pt did not demonstrate any adverse s/s throughout evaluation.   -DO     General Information    Precautions/Limitations fall precautions;hip precautions- left   WBAT LLE  -DO     Prior Level of Function min assist:;all household mobility   Pt at [place] prior to admission; assist for ADLs.  -DO     Equipment Currently Used at Home walker, rolling  -DO     Plans/Goals Discussed With patient;family  -DO      Benefits Reviewed patient:;family:  -DO     Barriers to Rehab cognitive status   Secondary to pain medication.  -DO     Living Environment    Lives With facility resident  -DO     Living Arrangements other (see comments)   [facility] home PTA; going to SNF following d/c.   -DO     Transportation Available ambulance;car;family or friend will provide   Pending progress.  -DO     Clinical Impression    Criteria for Skilled Therapeutic Interventions Met yes  -DO     Pathology/Pathophysiology Noted (Describe Specifically for Each System) musculoskeletal  -DO     Impairments Found (describe specific impairments) aerobic capacity/endurance;gait, locomotion, and balance;muscle performance;ROM  -DO     Functional Limitations in Following Categories (Describe Specific Limitations) self-care;home management;community/leisure  -DO     Rehab Potential good, to achieve stated therapy goals  -DO     Pain Assessment    Pain Assessment Hoang-Patiño FACES  -DO     Hoang-Patiño FACES Pain Rating 6  -DO     Pain Type Acute pain;Surgical pain  -DO     Pain Location Hip  -DO     Pain Orientation Left  -DO     Pain Intervention(s) Medication (See MAR);Repositioned;Ambulation/increased activity  -DO     Vision Assessment/Intervention    Visual Impairment unable/difficult to assess   Appears intact, but pt lethargic/confused during eval.  -DO     Cognitive Assessment/Intervention    Current Cognitive/Communication Assessment impaired  -DO     Orientation Status oriented to;person;required verbal cueing (specifiy in comments)  -DO     Follows Commands/Answers Questions 25% of the time;able to follow single-step instructions;needs cueing;needs increased time;needs repetition  -DO     Personal Safety moderate impairment;decreased awareness, need for assist  -DO     Personal Safety Interventions fall prevention program maintained;gait belt;muscle strengthening facilitated;nonskid shoes/slippers when out of bed  -DO     ROM (Range of Motion)    General  ROM Detail BUE appears to be WFL, RLE is WFL, LLE limited secondary to recent surgery.  -DO     MMT (Manual Muscle Testing)    General MMT Assessment Detail Pt demonstrates functional strength for standing and should be considered at least 2+/5 grossly.   -DO     Mobility Assessment/Training    Extremity Weight-Bearing Status left lower extremity  -DO     Left Lower Extremity Weight-Bearing weight-bearing as tolerated  -DO     Bed Mobility, Assessment/Treatment    Bed Mob, Supine to Sit, [place] maximum assist (25% patient effort)  -DO     Bed Mob, Sit to Supine, [place] maximum assist (25% patient effort)  -DO     Bed Mobility, Safety Issues cognitive deficits limit understanding;decreased use of arms for pushing/pulling;decreased use of legs for bridging/pushing;impaired trunk control for bed mobility  -DO     Bed Mobility, Impairments ROM decreased;strength decreased;pain  -DO     Bed Mobility, Comment Pt required Max x 1 to bring BLEs off bed and to support trunk when coming up into sitting at EOB. Pt also required Min x 1 to maintain sitting balance.   -DO     Transfer Assessment/Treatment    Transfers, Sit-Stand [place] moderate assist (50% patient effort);verbal cues required;nonverbal cues required (demo/gesture)  -DO     Transfers, Stand-Sit [place] moderate assist (50% patient effort);verbal cues required;nonverbal cues required (demo/gesture)  -DO     Transfers, Sit-Stand-Sit, Assist Device rolling walker  -DO     Transfer, Maintain Weight Bearing Status able to maintain weight bearing status  -DO     Transfer, Safety Issues weight-shifting ability decreased;loses balance backward  -DO     Transfer, Impairments ROM decreased;strength decreased;pain  -DO     Transfer, Comment Pt required tactile cues for hand placement on RWx, along with Modx 1 to perform sit-stand safely and effectively.   -DO     Gait Assessment/Treatment    Gait, [place] Level not appropriate to assess    Secondary to confusion; inconsistent w/ following direction.  -DO     Stairs Assessment/Treatment    Stairs, [place] Level not appropriate to assess  -DO     Therapy Exercises    Left Lower Extremity LAQ  -DO     Exercise Protocols total hip  -DO     Total Hip Exercises left:;10 reps;ankle pumps/circles;LAQ  -DO     Sensory Assessment/Intervention    Sensory Impairment --   Difficult to assess secondary to confused state.  -DO     Positioning and Restraints    Pre-Treatment Position in bed  -DO     Post Treatment Position bed  -DO     In Bed supine;call light within reach;encouraged to call for assist;exit alarm on;with family/caregiver;pillow between legs  -DO       User Key  (r) = Recorded By, (t) = Taken By, (c) = Cosigned By    Initials Name Provider Type    DO Leonardo Wesley PT Physical Therapist          Physical Therapy Education     Title: PT OT SLP Therapies (Done)     Topic: Physical Therapy (Done)     Point: Mobility training (Done)    Learning Progress Summary    Learner Readiness Method Response Comment Documented by Status   Patient Acceptance E VU,NR  DO 08/20/17 1124 Done   Family Acceptance E VU,NR  DO 08/20/17 1124 Done               Point: Home exercise program (Done)    Learning Progress Summary    Learner Readiness Method Response Comment Documented by Status   Patient Acceptance E VU,NR  DO 08/20/17 1124 Done   Family Acceptance E VU,NR  DO 08/20/17 1124 Done               Point: Body mechanics (Done)    Learning Progress Summary    Learner Readiness Method Response Comment Documented by Status   Patient Acceptance E VU,NR  DO 08/20/17 1124 Done   Family Acceptance E VU,NR  DO 08/20/17 1124 Done               Point: Precautions (Done)    Learning Progress Summary    Learner Readiness Method Response Comment Documented by Status   Patient Acceptance E VU,NR  DO 08/20/17 1124 Done   Family Acceptance E VU,NR  DO 08/20/17 1124 Done                      User Key     Initials Effective Dates  Name Provider Type Discipline    DO 01/27/16 -  Leonardo Wesley PT Physical Therapist PT                PT Recommendation and Plan  Anticipated Equipment Needs At Discharge: front wheeled walker  Anticipated Discharge Disposition: skilled nursing facility  Planned Therapy Interventions: balance training, bed mobility training, gait training, home exercise program, ROM (Range of Motion), strengthening, transfer training  PT Frequency: daily  Plan of Care Review  Plan Of Care Reviewed With: patient, son  Outcome Summary/Follow up Plan: Pt was confused throughout evaluation and was inconsistent with answering questions/following directions. She was able to perform bed mobility with Max x 1 and stand at EOB with use of RWx and Mod x 1. Pt required verbal/tactile cues throughout entire evaluation. She was able to tolerate standing at EOB and demonstrate WBAT LLE, for about 15 seconds before needing to sit secondary to pain in LLE. Sit-stand was performed twice, prior to being repositioned back in bed. Pt was unable to take steps today secondary to confused state and pain in the LLE. Pt and her son were educated on posterior hip precautions along with WBing status, with son able to verbalize his understanding. Pt will benefit from continuing skilled PT at this time in order to promote WBing and to improve independence with functional mobility and decrease falls risk. PT recommends d/c to SNF when appropriate.           IP PT Goals       08/20/17 1125          Bed Mobility PT LTG    Bed Mobility PT LTG, Date Established 08/20/17  -DO      Bed Mobility PT LTG, Time to Achieve 1 wk  -DO      Bed Mobility PT LTG, Activity Type all bed mobility  -DO      Bed Mobility PT LTG, [place] Level minimum assist (75% patient effort)  -DO      Transfer Training PT LTG    Transfer Training PT LTG, Date Established 08/20/17  -DO      Transfer Training PT LTG, Activity Type all transfers  -DO      Transfer Training PT LTG,  [place] Level minimum assist (75% patient effort)  -DO      Transfer Training PT LTG, Assist Device walker, rolling  -DO      Gait Training PT LTG    Gait Training Goal PT LTG, Date Established 08/20/17  -DO      Gait Training Goal PT LTG, Time to Achieve 1 wk  -DO      Gait Training Goal PT LTG, [place] Level minimum assist (75% patient effort)  -DO      Gait Training Goal PT LTG, Assist Device walker, rolling  -DO      Gait Training Goal PT LTG, Distance to Achieve 25 ft  -DO        User Key  (r) = Recorded By, (t) = Taken By, (c) = Cosigned By    Initials Name Provider Type    DO Leonardo Wesley PT Physical Therapist                Outcome Measures       08/20/17 1100          How much help from another person do you currently need...    Turning from your back to your side while in flat bed without using bedrails? 2  -DO      Moving from lying on back to sitting on the side of a flat bed without bedrails? 2  -DO      Moving to and from a bed to a chair (including a wheelchair)? 1  -DO      Standing up from a chair using your arms (e.g., wheelchair, bedside chair)? 2  -DO      Climbing 3-5 steps with a railing? 1  -DO      To walk in hospital room? 1  -DO      AM-PAC 6 Clicks Score 9  -DO      Functional Assessment    Outcome Measure Options AM-PAC 6 Clicks Basic Mobility (PT)  -DO        User Key  (r) = Recorded By, (t) = Taken By, (c) = Cosigned By    Initials Name Provider Type    DO Leonardo Wesley PT Physical Therapist           Time Calculation:         PT Charges       08/20/17 1129          Time Calculation    Start Time 1047  -DO      Stop Time 1109  -DO      Time Calculation (min) 22 min  -DO      PT Received On 08/20/17  -DO      PT - Next Appointment 08/21/17  -DO      PT Goal Re-Cert Due Date 08/27/17  -DO        User Key  (r) = Recorded By, (t) = Taken By, (c) = Cosigned By    Initials Name Provider Type    DO Leonardo Wesley PT Physical Therapist          Therapy Charges for Today     Code  Description Service Date Service Provider Modifiers Qty    10827681456 HC PT EVAL MOD COMPLEXITY 2 8/20/2017 Leonardo Wesley, PT GP 1    52973405773 HC PT THER PROC EA 15 MIN 8/20/2017 Leonardo Wesley, PT GP 1          PT G-Codes  Outcome Measure Options: AM-PAC 6 Clicks Basic Mobility (PT)      Leonardo Wesley, PT  8/20/2017

## 2017-08-20 NOTE — PROGRESS NOTES
Bipolar Hip Replacement Progress Note        Patient: Rosetta Hemphill    Date of Admission: 8/16/2017  7:41 PM    YOB: 1930    Medical Record Number: [id number]    Attending Physician: Kye Tillman MD  Consulting Physician(s): Dr. Greene    POD # 2 Days Post-Op Procedure(s) (LRB):  PARTIAL HIP REPLACEMENT (Left)       Systemic or Specific Complaints: Confusion following narcotic medication.     Allergies: No Known Allergies    Medications:   Current Medications:  Scheduled Meds:  amLODIPine 5 mg Oral Daily   aspirin 81 mg Oral Daily   atorvastatin 80 mg Oral Daily   carvedilol 12.5 mg Oral BID With Meals   gabapentin 100 mg Oral Daily   levothyroxine 75 mcg Oral Q AM   metoprolol tartrate 25 mg Oral BID   montelukast 10 mg Oral Nightly   pantoprazole 40 mg Oral Q AM   warfarin 5 mg Oral Daily     Continuous Infusions:  sodium chloride 75 mL/hr Last Rate: 75 mL/hr (08/20/17 0559)     PRN Meds:.•  acetaminophen  •  bisacodyl  •  docusate sodium  •  fluticasone  •  HYDROcodone-acetaminophen  •  Morphine  •  ondansetron  •  sodium chloride  •  Insert peripheral IV **AND** sodium chloride      Physical Exam: 87 y.o. female Body mass index is 29.05 kg/(m^2).  General Appearance:    confused            Pain Relief: Patient reports  some relief    Vitals:    08/19/17 2002 08/19/17 2300 08/20/17 0500 08/20/17 0700   BP: 133/91 127/98  129/57   BP Location: Left arm Left arm  Left arm   Patient Position: Lying Lying  Lying   Pulse:  77  66   Resp: 18 18  18   Temp: 97.4 °F (36.3 °C) 98.2 °F (36.8 °C)  98.3 °F (36.8 °C)   TempSrc: Oral Oral  Oral   SpO2:  95%  98%   Weight:   139 lb (63 kg)    Height:              HEENT:    Normocephalic, without obvious abnormality, atraumatic.          PERRLA; EOMI; Neck supple with trachea midline. No JVD.    No lymphadenopathy     Lungs:     Respirations regular, even and unlabored      Heart:    Regular rhythm and normal rate.   Abdomen:     Normal bowel sounds, no masses,  soft non-tender, non-distended       Extremities:   Operative extremity neurovascular status intact. ROM intact.    Incision intact w/out signs or symptoms of infection.  No cyanosis, no calf tenderness     Pulses:     Pulses palpable and equal bilaterally     Skin:     Skin Warm/Dry w/out ulceration, or cyanosis     Activity: Mobilizing Per P.T.   Weight Bearing: As Tolerated    Diagnostic Tests:   Admission on 08/16/2017   No results displayed because visit has over 200 results.          Ct Head Without Contrast    Result Date: 8/17/2017  Narrative: CT HEAD WITHOUT CONTRAST  HISTORY: Slurred speech, confusion.  TECHNIQUE: A noncontrasted CT examination of the brain was performed and compared to the CT examination of 08/16/2017.  FINDINGS: Again identified are areas of decreased attenuation involving the right parietal and occipital lobes posterolaterally extending to the superior and posterior aspect of the right temporal lobe suggesting an acute to subacute infarct. Smaller areas of decreased attenuation involving the right frontal lobe laterally and involving the insular cortex on the right are appreciated also present previously and unchanged. No new area of decreased attenuation to suggest acute infarction is identified. There is no evidence of hemorrhage. Further evaluation could be performed with a MRI examination of the brain as indicated.  The above information was called to the patient's nurse at time of the dictation. The patient's nurse is to immediately relay the information to the clinical service.  This report was finalized on 8/17/2017 12:49 PM by Dr. Mika Delgadillo MD.      Ct Head Without Contrast    Result Date: 8/17/2017  Narrative: CT HEAD WITHOUT CONTRAST  HISTORY: Fall, hit head, headache, on Coumadin.  COMPARISON: None.  FINDINGS: Bone windows showed no evidence of a calvarial fracture. The brain ventricles are symmetrical. There is moderate atrophy. Moderate facet calcification is noted.   An air-fluid level is present in the right maxillary sinus with moderate mucosal thickening involving the right maxillary sinus present.  There is an area decreased attenuation involving the right parietal lobe laterally suspicious for a moderate size acute to subacute infarct. This measures approximately 5 cm in AP dimension. A smaller area of decreased attenuation is appreciated involving the cortex of the right frontal lobe superiolaterally measuring approximately 1.2 cm in transverse dimension. There is no evidence of hemorrhage. A small scalp hematoma is noted in the left parietal region.  Further evaluation could be performed with MRI examination of the brain as indicated.  The above information was called to and discussed with Dr. Patiño.  This report was finalized on 8/17/2017 6:57 AM by Dr. Mika Delgadillo MD.      Xr Hip With Or Without Pelvis 1 View Left    Result Date: 8/18/2017  Narrative: XR HIP W OR WO PELVIS 1 VIEW LEFT-  INDICATIONS: Postoperative evaluation.  TECHNIQUE:     Frontal views of the pelvis and left hip  COMPARISON: None available  FINDINGS:   Intact appearing left proximal femoral hemiarthroplasty hardware is seen with adjacent surgical soft tissue gas,  overlying skin staples. No acute fracture is identified.       Impression:  Postsurgical changes.    This report was finalized on 8/18/2017 4:28 PM by Dr. Jac Peter MD.      Xr Hip With Or Without Pelvis 2 - 3 View Left    Result Date: 8/17/2017  Narrative: PELVIS AND LEFT HIP  HISTORY fall, pain.  FINDINGS: An AP view of the pelvis as well as AP and crosstable lateral views of the left hip demonstrates a subcapital fracture on the left. There is no evidence of dislocation. There is moderate displacement. The above information was called to and discussed with Dr. Patiño.  This report was finalized on 8/17/2017 6:57 AM by Dr. Mika Delgadillo MD.        Assessment:  Doing well POD  # 2 Days Post-Op following Bipolar Hip Replacement,  except for confusion following single narcotic dose during the night, dcd lortab  Acute Blood Loss Anemia  Post-operative Pain  Limited mobility, requires use of walker and assistance when OOB.    Patient Active Problem List   Diagnosis   • Subcapital fracture of hip   • Atrial fibrillation   • HX: long term anticoagulant use   • Hypothyroidism   • HTN (hypertension)        Plan:    Dr. Tillman for medical management.  Dc lortab, tylenol only with prn MS for breakthrough pain  Continue efforts to increase mobilization, hip precautions  Continue Pain Control Measures  Continue incisional Care  DVT prophylaxis  Possible discharge tomorrow  Follow up per Dr. Greene.    Date: 8/20/2017  Jesenia Vora, APRN

## 2017-08-21 NOTE — DISCHARGE SUMMARY
PHYSICIAN DISCHARGE SUMMARY                                                                        [facility]    Patient Identification:  Name: Rosetta Hemphill  Age: 87 y.o.  Sex: female  :  1930  MRN: [id number]  Primary Care Physician: Guzman Watkins MD    Admit date: 2017  Discharge date and time:2017  Discharged Condition: fair    Discharge Diagnoses:Principal Problem:    Subcapital fracture of hip  Active Problems:    Atrial fibrillation    HX: long term anticoagulant use    Hypothyroidism    HTN (hypertension)    Anemia     Patient Active Problem List   Diagnosis Code   • Subcapital fracture of hip S72.019A   • Atrial fibrillation I48.91   • HX: long term anticoagulant use Z79.01   • Hypothyroidism E03.9   • HTN (hypertension) I10   • Anemia D64.9       PMHX:   Past Medical History:   Diagnosis Date   • Arthritis    • Asthma    • Atrial fibrillation    • Disease of thyroid gland    • GERD (gastroesophageal reflux disease)    • Hyperlipidemia    • Hypertension    • Stroke      PSHX:   Past Surgical History:   Procedure Laterality Date   • CA PARTIAL HIP REPLACEMENT Left 2017    Procedure: PARTIAL HIP REPLACEMENT;  Surgeon: Iam Greene MD;  Location: [facility];  Service: Orthopedics       Hospital Course: Rosetta Hemphill  is a 87-year-old female who has complicated past medical history including recent hospitalization at [facility] for surgery.  It was for kyphoplasty for vertebral compression fracture.  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  According to her she was adjusting her bedsheets when she lost her balance and fell on her left side.  She is unable to get up or bear weight on the leg.  And she had left hip pain.  This resulted in her being transferred to the emergency room for further evaluation where workup revealed subcapital  fracture of the left hip and contusion of the left scalp.  Patient denies losing consciousness or incontinence.            The patient was admitted to the hospital and Coumadin was placed on hold and patient had partial left total hip replacement.  Postoperatively the patient was still pretty weak needs to go to skilled nursing facility for rehabilitation.  She's going to go to a skilled nursing facility in [place] and will continue with her Coumadin will need to follow ProTime INR and adjust Coumadin to keep INR between 2 and 3.  She'll need ongoing physical therapy for strengthening until she is well enough to go home with her family.  She'll follow-up with orthopedic surgery either in [place] or here.    Consults:     Consults     Date and Time Order Name Status Description    8/16/2017 2053 Ortho (on-call MD unless specified) Completed     8/16/2017 2053 LHA (on-call MD unless specified) Completed           Lab Results  Lab Value Date/Time   INR 1.82 (H) 08/21/2017 0543   INR 1.49 (H) 08/20/2017 0614   INR 1.29 (H) 08/19/2017 0443   INR 1.34 (H) 08/18/2017 1715   INR 1.39 (H) 08/18/2017 0531   INR 1.88 (H) 08/17/2017 0508   INR 1.86 (H) 08/17/2017 0052   INR 4.45 (C) 08/16/2017 2015         Results from last 7 days  Lab Units 08/21/17  0543   WBC 10*3/mm3 7.24   HEMOGLOBIN g/dL 10.1*   HEMATOCRIT % 30.7*   PLATELETS 10*3/mm3 150       Results from last 7 days  Lab Units 08/21/17  0543   SODIUM mmol/L 132*   POTASSIUM mmol/L 3.7   CHLORIDE mmol/L 100   CO2 mmol/L 20.9*   BUN mg/dL 24*   CREATININE mg/dL 0.61   GLUCOSE mg/dL 117*   CALCIUM mg/dL 8.5*     Significant Diagnostic Studies:   Lab Results   Component Value Date    WBC 7.24 08/21/2017    HGB 10.1 (L) 08/21/2017    HCT 30.7 (L) 08/21/2017     08/21/2017     Lab Results   Component Value Date     (L) 08/21/2017    K 3.7 08/21/2017     08/21/2017    CO2 20.9 (L) 08/21/2017    BUN 24 (H) 08/21/2017    CREATININE 0.61 08/21/2017     GLUCOSE 117 (H) 08/21/2017     Lab Results   Component Value Date    CALCIUM 8.5 (L) 08/21/2017     No results found for: AST, ALT, ALKPHOS  Lab Results   Component Value Date    INR 1.82 (H) 08/21/2017     No results found for: COLORU, CLARITYU, SPECGRAV, PHUR, PROTEINUR, GLUCOSEU, KETONESU, BLOODU, NITRITE, LEUKOCYTESUR, BILIRUBINUR, UROBILINOGEN, RBCUA, WBCUA, BACTERIA  No results found for: TROPONINT, TROPONINI, BNP  No components found for: HGBA1C;2  No components found for: TSH;2  Imaging Results (all)     Procedure Component Value Units Date/Time    XR Hip With or Without Pelvis 2 - 3 View Left [[id number]] Collected:  08/16/17 2211     Updated:  08/17/17 0700    Narrative:       PELVIS AND LEFT HIP     HISTORY fall, pain.     FINDINGS: An AP view of the pelvis as well as AP and crosstable lateral  views of the left hip demonstrates a subcapital fracture on the left.  There is no evidence of dislocation. There is moderate displacement. The  above information was called to and discussed with Dr. Patiño.     This report was finalized on 8/17/2017 6:57 AM by Dr. Mika Delgadillo MD.       CT Head Without Contrast [[id number]] Collected:  08/16/17 2205     Updated:  08/17/17 0700    Narrative:       CT HEAD WITHOUT CONTRAST     HISTORY: Fall, hit head, headache, on Coumadin.     COMPARISON: None.     FINDINGS: Bone windows showed no evidence of a calvarial fracture. The  brain ventricles are symmetrical. There is moderate atrophy. Moderate  facet calcification is noted.     An air-fluid level is present in the right maxillary sinus with moderate  mucosal thickening involving the right maxillary sinus present.     There is an area decreased attenuation involving the right parietal lobe  laterally suspicious for a moderate size acute to subacute infarct. This  measures approximately 5 cm in AP dimension. A smaller area of decreased  attenuation is appreciated involving the cortex of the right frontal  lobe  superiolaterally measuring approximately 1.2 cm in transverse  dimension. There is no evidence of hemorrhage. A small scalp hematoma is  noted in the left parietal region.     Further evaluation could be performed with MRI examination of the brain  as indicated.     The above information was called to and discussed with Dr. Patiño.     This report was finalized on 8/17/2017 6:57 AM by Dr. Mika Delgadillo MD.       CT Head Without Contrast [[id number]] Collected:  08/17/17 0926     Updated:  08/17/17 1252    Narrative:       CT HEAD WITHOUT CONTRAST     HISTORY: Slurred speech, confusion.     TECHNIQUE: A noncontrasted CT examination of the brain was performed and  compared to the CT examination of 08/16/2017.     FINDINGS: Again identified are areas of decreased attenuation involving  the right parietal and occipital lobes posterolaterally extending to the  superior and posterior aspect of the right temporal lobe suggesting an  acute to subacute infarct. Smaller areas of decreased attenuation  involving the right frontal lobe laterally and involving the insular  cortex on the right are appreciated also present previously and  unchanged. No new area of decreased attenuation to suggest acute  infarction is identified. There is no evidence of hemorrhage. Further  evaluation could be performed with a MRI examination of the brain as  indicated.      The above information was called to the patient's nurse at time of the  dictation. The patient's nurse is to immediately relay the information  to the clinical service.     This report was finalized on 8/17/2017 12:49 PM by Dr. Mika Delgadillo MD.       XR Hip With or Without Pelvis 1 View Left [[id number]] Collected:  08/18/17 1627     Updated:  08/18/17 1631    Narrative:       XR HIP W OR WO PELVIS 1 VIEW LEFT-     INDICATIONS: Postoperative evaluation.     TECHNIQUE:     Frontal views of the pelvis and left hip     COMPARISON: None available     FINDINGS:      Intact  appearing left proximal femoral hemiarthroplasty hardware is  seen with adjacent surgical soft tissue gas,  overlying skin staples. No  acute fracture is identified.          Impression:          Postsurgical changes.           This report was finalized on 8/18/2017 4:28 PM by Dr. Jac Peter MD.           Lab Results (last 7 days)     Procedure Component Value Units Date/Time    CBC & Differential [[id number]] Collected:  08/16/17 2015    Specimen:  Blood Updated:  08/16/17 2026    Narrative:       The following orders were created for panel order CBC & Differential.  Procedure                               Abnormality         Status                     ---------                               -----------         ------                     CBC Auto Differential[[id number]]        Abnormal            Final result                 Please view results for these tests on the individual orders.    CBC Auto Differential [[id number]]  (Abnormal) Collected:  08/16/17 2015    Specimen:  Blood Updated:  08/16/17 2026     WBC 4.88 10*3/mm3      RBC 3.09 (L) 10*6/mm3      Hemoglobin 10.1 (L) g/dL      Hematocrit 32.4 (L) %      .9 (H) fL      MCH 32.7 (H) pg      MCHC 31.2 (L) g/dL      RDW 15.9 (H) %      RDW-SD 61.0 (H) fl      MPV 8.5 fL      Platelets 192 10*3/mm3      Neutrophil % 43.0 %      Lymphocyte % 45.1 %      Monocyte % 9.0 %      Eosinophil % 2.3 %      Basophil % 0.2 %      Immature Grans % 0.4 %      Neutrophils, Absolute 2.10 10*3/mm3      Lymphocytes, Absolute 2.20 10*3/mm3      Monocytes, Absolute 0.44 10*3/mm3      Eosinophils, Absolute 0.11 10*3/mm3      Basophils, Absolute 0.01 10*3/mm3      Immature Grans, Absolute 0.02 10*3/mm3     Basic Metabolic Panel [[id number]]  (Abnormal) Collected:  08/16/17 2015    Specimen:  Blood Updated:  08/16/17 2051     Glucose 104 (H) mg/dL      BUN 23 mg/dL      Creatinine 0.88 mg/dL      Sodium 139 mmol/L      Potassium 4.3 mmol/L      Chloride 105 mmol/L       CO2 19.8 (L) mmol/L      Calcium 9.3 mg/dL      eGFR Non African Amer 61 mL/min/1.73      BUN/Creatinine Ratio 26.1 (H)     Anion Gap 14.2 mmol/L     Narrative:       The MDRD GFR formula is only valid for adults with stable renal function between ages 18 and 70.    Protime-INR [[id number]]  (Abnormal) Collected:  08/16/17 2015    Specimen:  Blood Updated:  08/16/17 2101     Protime 41.1 (H) Seconds      INR 4.45 (C)    Protime-INR [[id number]]  (Abnormal) Collected:  08/17/17 0052    Specimen:  Blood Updated:  08/17/17 0114     Protime 20.8 (H) Seconds      INR 1.86 (H)    CBC (No Diff) [[id number]]  (Abnormal) Collected:  08/17/17 0508    Specimen:  Blood Updated:  08/17/17 0608     WBC 7.55 10*3/mm3      RBC 3.25 (L) 10*6/mm3      Hemoglobin 10.8 (L) g/dL      Hematocrit 33.7 (L) %      .7 (H) fL      MCH 33.2 (H) pg      MCHC 32.0 (L) g/dL      RDW 15.9 (H) %      RDW-SD 60.8 (H) fl      MPV 8.9 fL      Platelets 214 10*3/mm3     Protime-INR [[id number]]  (Abnormal) Collected:  08/17/17 0508    Specimen:  Blood Updated:  08/17/17 0613     Protime 20.9 (H) Seconds      INR 1.88 (H)    Comprehensive Metabolic Panel [[id number]]  (Abnormal) Collected:  08/17/17 0508    Specimen:  Blood Updated:  08/17/17 0623     Glucose 109 (H) mg/dL      BUN 17 mg/dL      Creatinine 0.72 mg/dL      Sodium 137 mmol/L      Potassium 4.0 mmol/L      Chloride 100 mmol/L      CO2 21.1 (L) mmol/L      Calcium 9.3 mg/dL      Total Protein 7.9 g/dL      Albumin 3.90 g/dL      ALT (SGPT) 25 U/L      AST (SGOT) 32 U/L      Alkaline Phosphatase 99 U/L      Total Bilirubin 0.5 mg/dL      eGFR Non African Amer 77 mL/min/1.73      Globulin 4.0 gm/dL      A/G Ratio 1.0 g/dL      BUN/Creatinine Ratio 23.6     Anion Gap 15.9 mmol/L     Narrative:       The MDRD GFR formula is only valid for adults with stable renal function between ages 18 and 70.    Troponin [[id number]]  (Normal) Collected:  08/17/17 0508    Specimen:  Blood Updated:  08/17/17  0633     Troponin T <0.010 ng/mL     Narrative:       Troponin T Reference Ranges:  Less than 0.03 ng/mL:    Negative for AMI  0.03 to 0.09 ng/mL:      Indeterminant for AMI  Greater than 0.09 ng/mL: Positive for AMI    TSH [[id number]]  (Abnormal) Collected:  08/17/17 0508    Specimen:  Blood Updated:  08/17/17 0633     TSH 10.390 (H) mIU/mL     POC Glucose Fingerstick [[id number]]  (Normal) Collected:  08/17/17 0657    Specimen:  Blood Updated:  08/17/17 0717     Glucose 110 mg/dL     Narrative:       Meter: [id number] : [id number] Eric WOOD    Protime-INR [[id number]]  (Abnormal) Collected:  08/18/17 0531    Specimen:  Blood Updated:  08/18/17 0629     Protime 16.6 (H) Seconds      INR 1.39 (H)    Basic Metabolic Panel [[id number]]  (Abnormal) Collected:  08/18/17 0531    Specimen:  Blood Updated:  08/18/17 0641     Glucose 102 (H) mg/dL      BUN 18 mg/dL      Creatinine 0.75 mg/dL      Sodium 135 (L) mmol/L      Potassium 4.1 mmol/L      Chloride 98 mmol/L      CO2 24.3 mmol/L      Calcium 8.9 mg/dL      eGFR Non African Amer 73 mL/min/1.73      BUN/Creatinine Ratio 24.0     Anion Gap 12.7 mmol/L     Narrative:       The MDRD GFR formula is only valid for adults with stable renal function between ages 18 and 70.    CBC & Differential [[id number]] Collected:  08/18/17 0531    Specimen:  Blood Updated:  08/18/17 0647    Narrative:       The following orders were created for panel order CBC & Differential.  Procedure                               Abnormality         Status                     ---------                               -----------         ------                     CBC Auto Differential[[id number]]        Abnormal            Final result                 Please view results for these tests on the individual orders.    CBC Auto Differential [[id number]]  (Abnormal) Collected:  08/18/17 0531    Specimen:  Blood Updated:  08/18/17 0647     WBC 6.69 10*3/mm3      RBC 2.83 (L) 10*6/mm3      Hemoglobin 9.6  (L) g/dL      Hematocrit 29.3 (L) %      .5 (H) fL      MCH 33.9 (H) pg      MCHC 32.8 g/dL      RDW 15.5 (H) %      RDW-SD 58.6 (H) fl      MPV 9.2 fL      Platelets 169 10*3/mm3      Neutrophil % 52.6 %      Lymphocyte % 32.7 %      Monocyte % 12.1 (H) %      Eosinophil % 2.5 %      Basophil % 0.1 %      Immature Grans % 0.0 %      Neutrophils, Absolute 3.51 10*3/mm3      Lymphocytes, Absolute 2.19 10*3/mm3      Monocytes, Absolute 0.81 10*3/mm3      Eosinophils, Absolute 0.17 10*3/mm3      Basophils, Absolute 0.01 10*3/mm3      Immature Grans, Absolute 0.00 10*3/mm3     Protime-INR [[id number]]  (Abnormal) Collected:  08/18/17 1715    Specimen:  Blood Updated:  08/18/17 1802     Protime 16.1 (H) Seconds      INR 1.34 (H)    CBC Auto Differential [[id number]]  (Abnormal) Collected:  08/19/17 0443    Specimen:  Blood Updated:  08/19/17 0534     WBC 9.16 10*3/mm3      RBC 2.74 (L) 10*6/mm3      Hemoglobin 9.2 (L) g/dL      Hematocrit 28.5 (L) %      .0 (H) fL      MCH 33.6 (H) pg      MCHC 32.3 (L) g/dL      RDW 15.3 (H) %      RDW-SD 58.0 (H) fl      MPV 9.4 fL      Platelets 148 10*3/mm3      Neutrophil % 70.6 %      Lymphocyte % 19.2 (L) %      Monocyte % 9.0 %      Eosinophil % 0.8 %      Basophil % 0.1 %      Immature Grans % 0.3 %      Neutrophils, Absolute 6.47 10*3/mm3      Lymphocytes, Absolute 1.76 10*3/mm3      Monocytes, Absolute 0.82 10*3/mm3      Eosinophils, Absolute 0.07 10*3/mm3      Basophils, Absolute 0.01 10*3/mm3      Immature Grans, Absolute 0.03 10*3/mm3     CBC & Differential [[id number]] Collected:  08/19/17 0443    Specimen:  Blood Updated:  08/19/17 0534    Narrative:       The following orders were created for panel order CBC & Differential.  Procedure                               Abnormality         Status                     ---------                               -----------         ------                     CBC Auto Differential[[id number]]        Abnormal            Final  result                 Please view results for these tests on the individual orders.    Protime-INR [[id number]]  (Abnormal) Collected:  08/19/17 0443    Specimen:  Blood Updated:  08/19/17 0537     Protime 15.7 (H) Seconds      INR 1.29 (H)    Basic Metabolic Panel [[id number]]  (Abnormal) Collected:  08/19/17 0443    Specimen:  Blood Updated:  08/19/17 0545     Glucose 115 (H) mg/dL      BUN 23 mg/dL      Creatinine 0.91 mg/dL      Sodium 135 (L) mmol/L      Potassium 5.3 (H) mmol/L      Chloride 100 mmol/L      CO2 21.6 (L) mmol/L      Calcium 9.5 mg/dL      eGFR Non African Amer 58 (L) mL/min/1.73      BUN/Creatinine Ratio 25.3 (H)     Anion Gap 13.4 mmol/L     Narrative:       The MDRD GFR formula is only valid for adults with stable renal function between ages 18 and 70.    CBC & Differential [[id number]] Collected:  08/20/17 0614    Specimen:  Blood Updated:  08/20/17 0725    Narrative:       The following orders were created for panel order CBC & Differential.  Procedure                               Abnormality         Status                     ---------                               -----------         ------                     CBC Auto Differential[[id number]]        Abnormal            Final result                 Please view results for these tests on the individual orders.    CBC Auto Differential [[id number]]  (Abnormal) Collected:  08/20/17 0614    Specimen:  Blood Updated:  08/20/17 0725     WBC 5.40 10*3/mm3      RBC 2.28 (L) 10*6/mm3      Hemoglobin 7.9 (L) g/dL      Hematocrit 23.7 (L) %      .9 (H) fL      MCH 34.6 (H) pg      MCHC 33.3 g/dL      RDW 15.4 (H) %      RDW-SD 58.7 (H) fl      MPV 9.8 fL      Platelets 129 (L) 10*3/mm3      Neutrophil % 63.5 %      Lymphocyte % 23.5 %      Monocyte % 10.9 %      Eosinophil % 1.9 %      Basophil % 0.2 %      Immature Grans % 0.0 %      Neutrophils, Absolute 3.43 10*3/mm3      Lymphocytes, Absolute 1.27 10*3/mm3      Monocytes, Absolute 0.59  10*3/mm3      Eosinophils, Absolute 0.10 10*3/mm3      Basophils, Absolute 0.01 10*3/mm3      Immature Grans, Absolute 0.00 10*3/mm3     Basic Metabolic Panel [[id number]]  (Abnormal) Collected:  08/20/17 0614    Specimen:  Blood Updated:  08/20/17 0732     Glucose 98 mg/dL      BUN 25 (H) mg/dL      Creatinine 0.64 mg/dL      Sodium 134 (L) mmol/L      Potassium 3.8 mmol/L      Chloride 101 mmol/L      CO2 17.4 (L) mmol/L      Calcium 8.4 (L) mg/dL      eGFR Non African Amer 88 mL/min/1.73      BUN/Creatinine Ratio 39.1 (H)     Anion Gap 15.6 mmol/L     Narrative:       The MDRD GFR formula is only valid for adults with stable renal function between ages 18 and 70.    Protime-INR [[id number]]  (Abnormal) Collected:  08/20/17 0614    Specimen:  Blood Updated:  08/20/17 0736     Protime 17.5 (H) Seconds      INR 1.49 (H)    Protime-INR [[id number]]  (Abnormal) Collected:  08/21/17 0543    Specimen:  Blood Updated:  08/21/17 0634     Protime 20.4 (H) Seconds      INR 1.82 (H)    CBC & Differential [[id number]] Collected:  08/21/17 0543    Specimen:  Blood Updated:  08/21/17 0651    Narrative:       The following orders were created for panel order CBC & Differential.  Procedure                               Abnormality         Status                     ---------                               -----------         ------                     CBC Auto Differential[[id number]]        Abnormal            Final result                 Please view results for these tests on the individual orders.    CBC Auto Differential [[id number]]  (Abnormal) Collected:  08/21/17 0543    Specimen:  Blood Updated:  08/21/17 0651     WBC 7.24 10*3/mm3      RBC 3.16 (L) 10*6/mm3      Hemoglobin 10.1 (L) g/dL      Hematocrit 30.7 (L) %      MCV 97.2 fL      MCH 32.0 pg      MCHC 32.9 g/dL      RDW 18.6 (H) %      RDW-SD 66.3 (H) fl      MPV 9.3 fL      Platelets 150 10*3/mm3      Neutrophil % 67.6 %      Lymphocyte % 21.1 %      Monocyte % 9.4 %       "Eosinophil % 1.9 %      Basophil % 0.0 %      Immature Grans % 0.0 %      Neutrophils, Absolute 4.89 10*3/mm3      Lymphocytes, Absolute 1.53 10*3/mm3      Monocytes, Absolute 0.68 10*3/mm3      Eosinophils, Absolute 0.14 10*3/mm3      Basophils, Absolute 0.00 10*3/mm3      Immature Grans, Absolute 0.00 10*3/mm3     Basic Metabolic Panel [[id number]]  (Abnormal) Collected:  08/21/17 0543    Specimen:  Blood Updated:  08/21/17 0657     Glucose 117 (H) mg/dL      BUN 24 (H) mg/dL      Creatinine 0.61 mg/dL      Sodium 132 (L) mmol/L      Potassium 3.7 mmol/L      Chloride 100 mmol/L      CO2 20.9 (L) mmol/L      Calcium 8.5 (L) mg/dL      eGFR Non African Amer 93 mL/min/1.73      BUN/Creatinine Ratio 39.3 (H)     Anion Gap 11.1 mmol/L     Narrative:       The MDRD GFR formula is only valid for adults with stable renal function between ages 18 and 70.        /82 (BP Location: Right arm, Patient Position: Lying)  Pulse 72  Temp 98.5 °F (36.9 °C) (Oral)   Resp 20  Ht 58\" (147.3 cm)  Wt 136 lb 14.4 oz (62.1 kg)  SpO2 95%  BMI 28.61 kg/m2    Discharge Exam:  General Appearance:    Alert, cooperative, no distress                          Head:    Normocephalic, without obvious abnormality, atraumatic                          Eyes:                            Throat:   Lips, tongue, gums normal                          Neck:   Supple, symmetrical, trachea midline, no JVD                        Lungs:     Clear to auscultation bilaterally, respirations unlabored                Chest Wall:    No tenderness or deformity                        Heart:    Irregular rate and rhythm, S1 and S2 normal, no murmur,no  Rub    or gallop                  Abdomen:     Soft, non-tender, bowel sounds active, no masses, no   organomegaly                  Extremities:   Extremities normal, left hip with surgical changes, no cyanosis or edema                             Skin:   Skin is warm and dry,  no rashes or palpable lesions     "              Neurologic:   no focal deficits noted     Disposition:  Skilled nursing facility    Patient Instructions:    Rosetta Hemphill   Home Medication Instructions LON:[id number]    Printed on:08/21/17 1604   Medication Information                      acetaminophen (TYLENOL) 325 MG tablet  Take 2 tablets by mouth Every 4 (Four) Hours As Needed for Mild Pain , Moderate Pain  or Headache.             amLODIPine (NORVASC) 5 MG tablet  Take 5 mg by mouth Daily.             aspirin 81 MG EC tablet  Take 81 mg by mouth Daily.             atorvastatin (LIPITOR) 80 MG tablet  Take 80 mg by mouth Daily.             Biotin 10 MG tablet  Take 5 mg by mouth Daily.             carvedilol (COREG) 12.5 MG tablet  Take 12.5 mg by mouth 2 (Two) Times a Day With Meals.             fluticasone (FLONASE) 50 MCG/ACT nasal spray  1 spray into each nostril Daily As Needed for Rhinitis.             gabapentin (NEURONTIN) 100 MG capsule  Take 100 mg by mouth Daily.             levothyroxine (SYNTHROID, LEVOTHROID) 75 MCG tablet  Take 75 mcg by mouth Daily.             lidocaine (LIDODERM) 5 %  Place 1 patch on the skin 2 (Two) Times a Day. Remove & Discard patch within 12 hours or as directed by MD             montelukast (SINGULAIR) 10 MG tablet  Take 10 mg by mouth Every Night.             pantoprazole (PROTONIX) 40 MG EC tablet  Take 40 mg by mouth Daily.             potassium chloride ER (K-TAB) 20 MEQ tablet controlled-release ER tablet  Take 20 mEq by mouth 2 (Two) Times a Day.             warfarin (COUMADIN) 5 MG tablet  Take 5 mg by mouth Daily.               No future appointments.  Additional Instructions for the Follow-ups that You Need to Schedule     The patient needs to have the staples in her left hip removed 8/29/17 at Dr. Greene's office.    The patient needs to follow up with Dr. Greene, as an office visit, on 9/13/17.           Follow-up Information     Follow up with Iam Greene MD Follow up on  9/13/2017.    Specialty:  Orthopedic Surgery    Why:  Or see orthopedic surgeon in [place].    Contact information:    [address] [place] [address]  [phone]          Discharge Order     Start     Ordered    08/21/17 1246  Discharge patient  Once     Expected Discharge Date:  08/21/17    Discharge Disposition:  Skilled Nursing Facility (DC - External)        08/21/17 1246          Total time spent discharging patient including evaluation,post hospitalization follow up,  medication and post hospitalization instructions and education total time exceeds 30 minutes.    Signed:  Kye Tillman MD  8/21/2017  12:46 PM

## 2017-08-21 NOTE — PLAN OF CARE
Problem: Patient Care Overview (Adult)  Goal: Plan of Care Review  Outcome: Ongoing (interventions implemented as appropriate)    08/21/17 0850   Coping/Psychosocial Response Interventions   Plan Of Care Reviewed With patient   Outcome Evaluation   Outcome Summary/Follow up Plan Pt with seemingly improved insight, able to take several steps to chair but very antalgic. HEP reviewed w/ pt and family. Pt plans to return to rehab at discharge.

## 2017-08-21 NOTE — PROGRESS NOTES
Continued Stay Note  [facility]     Patient Name: Rosetta Hemphill  MRN: [id number]  Today's Date: 8/21/2017    Admit Date: 8/16/2017          Discharge Plan       08/21/17 1133    Case Management/Social Work Plan    Plan [facility] in [place], [place]    Additional Comments CCP spoke with Briana in Admissions at [facility] and she states pt needs PAYSSAR from [facility]. Briana states if no flags should be obtained within 1 day. CCP faxed over updated Clinicals and PT notes. Anticipated dc soon. Packet in CCP office. Everardo MENESES/CCP              Discharge Codes     None            Hollie Elizabeth, RN

## 2017-08-21 NOTE — PROGRESS NOTES
Continued Stay Note  [facility]     Patient Name: Rosetta Hemphill  MRN: [id number]  Today's Date: 8/21/2017    Admit Date: 8/16/2017          Discharge Plan       08/21/17 1503    Case Management/Social Work Plan    Plan [facility] in [facility]    Patient/Family In Agreement With Plan yes    Additional Comments DC orders noted. CCP spoke with Briana at [place] and she states pt has been accepted and they have Payssar for [facility] and deny needing any additional clinicals. CCP spoke with pts son and he will transport pt today. Packet given to ZAIRA Luong. Everardo MENESES/MADISON              Discharge Codes     None        Expected Discharge Date and Time     Expected Discharge Date Expected Discharge Time    Aug 21, 2017             Hollie Elizabeth RN

## 2017-08-21 NOTE — PROGRESS NOTES
Orthopedic Progress Note      Patient: Rosetta Hemphill  YOB: 1930     Date of Admission: 8/16/2017  7:41 PM Medical Record Number: [id number]     Attending Physician: Kye Tillman MD    Status Post:  [place] PARTIAL HIP REPLACEMENT [79760] (HIP ENDOPROSTHESIS) Post Operative Day Number: 3    Subjective : No new orthopaedic complaints     Pain Relief: some relief with present medication.     Systemic Complaints: No Complaints  Vitals:    08/20/17 1900 08/20/17 2300 08/21/17 0100 08/21/17 0600   BP: 159/88 (!) 152/103 150/87    BP Location: Left arm Right arm     Patient Position: Lying Lying     Pulse:  69     Resp: 20 20     Temp: 98 °F (36.7 °C) 98.1 °F (36.7 °C)     TempSrc: Oral Oral     SpO2:  94%     Weight:    136 lb 14.4 oz (62.1 kg)   Height:           Physical Exam: 87 y.o. female    General Appearance:       Alert, cooperative, in no acute distress                  Extremities:    Dressing Clean, Dry and Intact         Incision healthy without signs or symptoms of infections         No clinical sign of DVT        Able to do good movements of digits    Pulses:   Pulses palpable and equal bilaterally           Diagnostic Tests:       Results from last 7 days  Lab Units 08/21/17 0543 08/20/17 0614 08/19/17 0443   WBC 10*3/mm3 7.24 5.40 9.16   HEMOGLOBIN g/dL 10.1* 7.9* 9.2*   HEMATOCRIT % 30.7* 23.7* 28.5*   PLATELETS 10*3/mm3 150 129* 148       Results from last 7 days  Lab Units 08/21/17  0543 08/20/17 0614 08/19/17  0443   SODIUM mmol/L 132* 134* 135*   POTASSIUM mmol/L 3.7 3.8 5.3*   CHLORIDE mmol/L 100 101 100   CO2 mmol/L 20.9* 17.4* 21.6*   BUN mg/dL 24* 25* 23   CREATININE mg/dL 0.61 0.64 0.91   GLUCOSE mg/dL 117* 98 115*   CALCIUM mg/dL 8.5* 8.4* 9.5       Results from last 7 days  Lab Units 08/21/17  0543 08/20/17  0614 08/19/17  0443   INR  1.82* 1.49* 1.29*     No results found for: CRYSTAL]  No results found for: CULTURE]  No results found for: URICACID]  Ct Head Without  Contrast    Result Date: 8/17/2017  Narrative: CT HEAD WITHOUT CONTRAST  HISTORY: Slurred speech, confusion.  TECHNIQUE: A noncontrasted CT examination of the brain was performed and compared to the CT examination of 08/16/2017.  FINDINGS: Again identified are areas of decreased attenuation involving the right parietal and occipital lobes posterolaterally extending to the superior and posterior aspect of the right temporal lobe suggesting an acute to subacute infarct. Smaller areas of decreased attenuation involving the right frontal lobe laterally and involving the insular cortex on the right are appreciated also present previously and unchanged. No new area of decreased attenuation to suggest acute infarction is identified. There is no evidence of hemorrhage. Further evaluation could be performed with a MRI examination of the brain as indicated.  The above information was called to the patient's nurse at time of the dictation. The patient's nurse is to immediately relay the information to the clinical service.  This report was finalized on 8/17/2017 12:49 PM by Dr. Mika Delgadillo MD.      Ct Head Without Contrast    Result Date: 8/17/2017  Narrative: CT HEAD WITHOUT CONTRAST  HISTORY: Fall, hit head, headache, on Coumadin.  COMPARISON: None.  FINDINGS: Bone windows showed no evidence of a calvarial fracture. The brain ventricles are symmetrical. There is moderate atrophy. Moderate facet calcification is noted.  An air-fluid level is present in the right maxillary sinus with moderate mucosal thickening involving the right maxillary sinus present.  There is an area decreased attenuation involving the right parietal lobe laterally suspicious for a moderate size acute to subacute infarct. This measures approximately 5 cm in AP dimension. A smaller area of decreased attenuation is appreciated involving the cortex of the right frontal lobe superiolaterally measuring approximately 1.2 cm in transverse dimension. There is  no evidence of hemorrhage. A small scalp hematoma is noted in the left parietal region.  Further evaluation could be performed with MRI examination of the brain as indicated.  The above information was called to and discussed with Dr. Patiño.  This report was finalized on 8/17/2017 6:57 AM by Dr. Mika Delgadillo MD.      Xr Hip With Or Without Pelvis 1 View Left    Result Date: 8/18/2017  Narrative: XR HIP W OR WO PELVIS 1 VIEW LEFT-  INDICATIONS: Postoperative evaluation.  TECHNIQUE:     Frontal views of the pelvis and left hip  COMPARISON: None available  FINDINGS:   Intact appearing left proximal femoral hemiarthroplasty hardware is seen with adjacent surgical soft tissue gas,  overlying skin staples. No acute fracture is identified.       Impression:  Postsurgical changes.    This report was finalized on 8/18/2017 4:28 PM by Dr. Jac Peter MD.      Xr Hip With Or Without Pelvis 2 - 3 View Left    Result Date: 8/17/2017  Narrative: PELVIS AND LEFT HIP  HISTORY fall, pain.  FINDINGS: An AP view of the pelvis as well as AP and crosstable lateral views of the left hip demonstrates a subcapital fracture on the left. There is no evidence of dislocation. There is moderate displacement. The above information was called to and discussed with Dr. Paitño.  This report was finalized on 8/17/2017 6:57 AM by Dr. Mika Delgadillo MD.          Current Medications:  Scheduled Meds:  amLODIPine 5 mg Oral Daily   aspirin 81 mg Oral Daily   atorvastatin 80 mg Oral Daily   carvedilol 12.5 mg Oral BID With Meals   gabapentin 100 mg Oral Daily   levothyroxine 75 mcg Oral Q AM   montelukast 10 mg Oral Nightly   pantoprazole 40 mg Oral Q AM   warfarin 5 mg Oral Daily     Continuous Infusions:  sodium chloride 75 mL/hr Last Rate: 75 mL/hr (08/20/17 0559)     PRN Meds:.•  acetaminophen  •  bisacodyl  •  docusate sodium  •  fluticasone  •  Morphine  •  ondansetron  •  sodium chloride  •  Insert peripheral IV **AND** sodium  chloride    Assessment: Status post  [place] PARTIAL HIP REPLACEMENT [72654] (HIP ENDOPROSTHESIS)    Patient Active Problem List   Diagnosis   • Subcapital fracture of hip   • Atrial fibrillation   • HX: long term anticoagulant use   • Hypothyroidism   • HTN (hypertension)   • Anemia       PLAN:   Continues current post-op course  Pain control: prn tylenol  Anticoagulation: warfarin started   Mobilize with PT as tolerated per protocol  Discharge Plan Subacute rehab today  Pt moving to rehab in [place] with son, 160 miles away, ok to f/u with general orthopedic doc near that rehab. Will be happy to assist family if unable to find accepting physician     Weight Bearing: WBAT  Discharge Plan: OK to plan for discharge in  today to SNF  from orthopadic perspective.    Olayinka Krishnamurthy MD    Date: 8/21/2017    Time: 7:38 AM

## 2017-08-21 NOTE — THERAPY TREATMENT NOTE
Acute Care - Physical Therapy Treatment Note  [facility]     Patient Name: Rosetta Hemphill  : 1930  MRN: [id number]  Today's Date: 2017             Admit Date: 2017    Visit Dx:    ICD-10-CM ICD-9-CM   1. Subcapital fracture of hip, left, closed, initial encounter S72.012A 820.09   2. Closed head injury, initial encounter S09.90XA 959.01   3. Fall, initial encounter W19.XXXA E888.9   4. Warfarin-induced coagulopathy T45.511A 286.9    D68.9 E934.2   5. Difficulty walking R26.2 719.7     Patient Active Problem List   Diagnosis   • Subcapital fracture of hip   • Atrial fibrillation   • HX: long term anticoagulant use   • Hypothyroidism   • HTN (hypertension)   • Anemia               Adult Rehabilitation Note       17 0818          Rehab Assessment/Intervention    Discipline physical therapy assistant  -ELADIO      Document Type therapy note (daily note)  -ELADIO      Subjective Information agree to therapy  -ELADIO      Patient Effort, Rehab Treatment good  -ELADIO      Precautions/Limitations fall precautions;hip precautions- left  -ELADIO      Recorded by [ELADIO] Antione Cummins PTA      Pain Assessment    Pain Assessment Hoang-Patiño FACES  -ELADIO      Hoang-Patiño FACES Pain Rating 6  -ELADIO      Pain Type Acute pain;Surgical pain  -ELADIO      Pain Location Hip  -ELADIO      Pain Orientation Left  -ELADIO      Pain Intervention(s) Ambulation/increased activity;Repositioned;Rest  -ELADIO      Recorded by [ELADIO] Antione Cummins PTA      Cognitive Assessment/Intervention    Current Cognitive/Communication Assessment impaired  -ELADIO      Orientation Status oriented to;person  -ELADIO      Follows Commands/Answers Questions 100% of the time;able to follow single-step instructions;needs increased time;needs cueing;needs repetition  -ELADIO      Personal Safety moderate impairment  -ELADIO      Personal Safety Interventions fall prevention program maintained;gait belt;nonskid shoes/slippers when out of bed  -ELADIO      Recorded by [ELADIO] Antione Cummins PTA      Bed Mobility,  Assessment/Treatment    Bed Mobility, Assistive Device bed rails;head of bed elevated;draw sheet  -ELADIO      Bed Mob, Supine to Sit, [place] maximum assist (25% patient effort);verbal cues required;nonverbal cues required (demo/gesture)  -ELADIO      Bed Mob, Sit to Supine, [place] not tested  -ELADIO      Bed Mobility, Safety Issues cognitive deficits limit understanding;decreased use of arms for pushing/pulling;decreased use of legs for bridging/pushing  -ELADIO      Bed Mobility, Impairments strength decreased;impaired balance;pain  -ELADIO      Recorded by [ELADIO] Antione Cummins PTA      Transfer Assessment/Treatment    Transfers, Sit-Stand [place] moderate assist (50% patient effort);verbal cues required;nonverbal cues required (demo/gesture)  -ELADIO      Transfers, Stand-Sit [place] moderate assist (50% patient effort);verbal cues required;nonverbal cues required (demo/gesture)  -ELADIO      Transfers, Sit-Stand-Sit, Assist Device rolling walker  -ELADIO      Transfer, Maintain Weight Bearing Status able to maintain weight bearing status  -ELADIO      Transfer, Safety Issues weight-shifting ability decreased;loses balance backward  -ELADIO      Transfer, Impairments ROM decreased;strength decreased;pain;impaired balance  -ELADIO      Recorded by [ELADIO] Antione Cummins PTA      Gait Assessment/Treatment    Gait, [place] Level moderate assist (50% patient effort);verbal cues required;nonverbal cues required (demo/gesture)  -ELADIO      Gait, Assistive Device rolling walker  -ELADIO      Gait, Distance (Feet) --   several steps to chair  -ELADIO      Gait, Gait Deviations left:;antalgic;xiao decreased;decreased heel strike;forward flexed posture;limb motion velocity decreased;step length decreased;toe-to-floor clearance decreased;weight-shifting ability decreased  -ELADIO      Gait, Safety Issues step length decreased;weight-shifting ability decreased;balance decreased during turns  -ELADIO      Gait, Impairments strength decreased;impaired  balance;pain;ROM decreased  -ELADIO      Recorded by [ELADIO] Antione Cummins PTA      Therapy Exercises    Exercise Protocols total hip  -ELADIO      Total Hip Exercises left:;10 reps;completed protocol;with assist  -ELADIO      Recorded by [ELADIO] Antione Cummins PTA      Positioning and Restraints    Pre-Treatment Position in bed  -ELADIO      Post Treatment Position chair  -ELADIO      In Chair reclined;call light within reach;encouraged to call for assist;exit alarm on;notified nsg;with family/caregiver  -ELADIO      Recorded by [ELADIO] Antione Cummins PTA        User Key  (r) = Recorded By, (t) = Taken By, (c) = Cosigned By    Initials Name Effective Dates    ELADIO Cummins PTA 04/24/15 -                 IP PT Goals       08/20/17 1125          Bed Mobility PT LTG    Bed Mobility PT LTG, Date Established 08/20/17  -DO      Bed Mobility PT LTG, Time to Achieve 1 wk  -DO      Bed Mobility PT LTG, Activity Type all bed mobility  -DO      Bed Mobility PT LTG, [place] Level minimum assist (75% patient effort)  -DO      Transfer Training PT LTG    Transfer Training PT LTG, Date Established 08/20/17  -DO      Transfer Training PT LTG, Activity Type all transfers  -DO      Transfer Training PT LTG, [place] Level minimum assist (75% patient effort)  -DO      Transfer Training PT LTG, Assist Device walker, rolling  -DO      Gait Training PT LTG    Gait Training Goal PT LTG, Date Established 08/20/17  -DO      Gait Training Goal PT LTG, Time to Achieve 1 wk  -DO      Gait Training Goal PT LTG, [place] Level minimum assist (75% patient effort)  -DO      Gait Training Goal PT LTG, Assist Device walker, rolling  -DO      Gait Training Goal PT LTG, Distance to Achieve 25 ft  -DO        User Key  (r) = Recorded By, (t) = Taken By, (c) = Cosigned By    Initials Name Provider Type    DO Leonardo Wesley PT Physical Therapist          Physical Therapy Education     Title: PT OT SLP Therapies (Done)     Topic: Physical Therapy (Done)     Point: Mobility  training (Done)    Learning Progress Summary    Learner Readiness Method Response Comment Documented by Status   Patient Acceptance E VU,NR  ELADIO 08/21/17 0849 Done    Acceptance E VU,NR  DO 08/20/17 1124 Done   Family Acceptance E VU,NR  DO 08/20/17 1124 Done               Point: Home exercise program (Done)    Learning Progress Summary    Learner Readiness Method Response Comment Documented by Status   Patient Acceptance E VU,NR  ELADIO 08/21/17 0849 Done    Acceptance E VU,NR  DO 08/20/17 1124 Done   Family Acceptance E VU,NR  DO 08/20/17 1124 Done               Point: Body mechanics (Done)    Learning Progress Summary    Learner Readiness Method Response Comment Documented by Status   Patient Acceptance E VU,NR  ELADIO 08/21/17 0849 Done    Acceptance E VU,NR  DO 08/20/17 1124 Done   Family Acceptance E VU,NR  DO 08/20/17 1124 Done               Point: Precautions (Done)    Learning Progress Summary    Learner Readiness Method Response Comment Documented by Status   Patient Acceptance E VU,NR  ELADIO 08/21/17 0849 Done    Acceptance E VU,NR  DO 08/20/17 1124 Done   Family Acceptance E VU,NR  DO 08/20/17 1124 Done                      User Key     Initials Effective Dates Name Provider Type Discipline    DO 01/27/16 -  Leonardo Wesley, PT Physical Therapist PT    ELADIO 04/24/15 -  Antione Cummins PTA Physical Therapy Assistant PT                    PT Recommendation and Plan  Anticipated Equipment Needs At Discharge: front wheeled walker  Anticipated Discharge Disposition: skilled nursing facility  Planned Therapy Interventions: balance training, bed mobility training, gait training, home exercise program, ROM (Range of Motion), strengthening, transfer training  PT Frequency: daily  Plan of Care Review  Plan Of Care Reviewed With: patient  Outcome Summary/Follow up Plan: Pt with seemingly improved insight, able to take several steps to chair but very antalgic.  HEP reviewed w/ pt and family.  Pt plans to return to rehab at discharge.            Outcome Measures       08/21/17 0800 08/20/17 1100       How much help from another person do you currently need...    Turning from your back to your side while in flat bed without using bedrails? 2  -ELADIO 2  -DO     Moving from lying on back to sitting on the side of a flat bed without bedrails? 2  -ELADIO 2  -DO     Moving to and from a bed to a chair (including a wheelchair)? 2  -ELADIO 1  -DO     Standing up from a chair using your arms (e.g., wheelchair, bedside chair)? 2  -ELADIO 2  -DO     Climbing 3-5 steps with a railing? 1  -ELADIO 1  -DO     To walk in hospital room? 2  -ELADIO 1  -DO     AM-PAC 6 Clicks Score 11  -ELADIO 9  -DO     Functional Assessment    Outcome Measure Options AM-PAC 6 Clicks Basic Mobility (PT)  -ELADIO AM-PAC 6 Clicks Basic Mobility (PT)  -DO       User Key  (r) = Recorded By, (t) = Taken By, (c) = Cosigned By    Initials Name Provider Type    DO Leonardo Wesley, PT Physical Therapist    ELADIO Cummins PTA Physical Therapy Assistant           Time Calculation:         PT Charges       08/21/17 0849          Time Calculation    Start Time 0818  -ELADIO      Stop Time 0842  -ELADIO      Time Calculation (min) 24 min  -ELADIO      PT Received On 08/21/17  -ELADIO      PT - Next Appointment 08/22/17  -ELADIO        User Key  (r) = Recorded By, (t) = Taken By, (c) = Cosigned By    Initials Name Provider Type    ELADIO Cummins PTA Physical Therapy Assistant          Therapy Charges for Today     Code Description Service Date Service Provider Modifiers Qty    40733622039 HC PT THER PROC EA 15 MIN 8/21/2017 Antione Cummins PTA GP 2          PT G-Codes  Outcome Measure Options: AM-PAC 6 Clicks Basic Mobility (PT)    Antione Cummins PTA  8/21/2017

## 2017-08-21 NOTE — PLAN OF CARE
Problem: Pain, Acute (Adult)  Goal: Acceptable Pain Control/Comfort Level  Outcome: Ongoing (interventions implemented as appropriate)    Problem: Anxiety (Adult)  Goal: Reduction/Resolution  Outcome: Ongoing (interventions implemented as appropriate)    Problem: Fractured Hip (Adult)  Goal: Signs and Symptoms of Listed Potential Problems Will be Absent or Manageable (Fractured Hip)  Outcome: Ongoing (interventions implemented as appropriate)    Problem: Perioperative Period (Adult)  Goal: Signs and Symptoms of Listed Potential Problems Will be Absent or Manageable (Perioperative Period)  Outcome: Ongoing (interventions implemented as appropriate)

## 2017-08-21 NOTE — DISCHARGE INSTR - ACTIVITY
Activity as tolerated.   Left Hip incision cleaned daily with Normal Saline and open to air if no drainage

## 2017-09-15 PROBLEM — Z96.642 HISTORY OF LEFT HIP REPLACEMENT: Status: ACTIVE | Noted: 2017-01-01

## 2017-09-15 PROBLEM — S73.006A DISLOCATED HIP (HCC): Status: ACTIVE | Noted: 2017-01-01

## 2017-09-15 PROBLEM — Z96.649 INSTABILITY OF PROSTHETIC HIP (HCC): Status: ACTIVE | Noted: 2017-01-01

## 2017-09-15 PROBLEM — T45.511A COUMADIN TOXICITY: Status: ACTIVE | Noted: 2017-01-01

## 2017-09-15 PROBLEM — S73.005A DISLOCATION OF LEFT HIP (HCC): Status: ACTIVE | Noted: 2017-01-01

## 2017-09-15 PROBLEM — I21.4 NSTEMI (NON-ST ELEVATED MYOCARDIAL INFARCTION) (HCC): Status: ACTIVE | Noted: 2017-01-01

## 2017-09-15 PROBLEM — T84.028A INSTABILITY OF PROSTHETIC HIP (HCC): Status: ACTIVE | Noted: 2017-01-01

## 2017-09-15 NOTE — ANESTHESIA PROCEDURE NOTES
Airway  Urgency: elective      General Information and Staff    Patient location during procedure: OR  Anesthesiologist: RENDER, CLEVE RAY    Indications and Patient Condition  Indications for airway management: airway protection    Preoxygenated: yes  Mask difficulty assessment: 1 - vent by mask    Final Airway Details  Final airway type: supraglottic airway      Successful airway: classic  Size 4

## 2017-09-15 NOTE — PROGRESS NOTES
Discharge Planning Assessment  [facility]     Patient Name: Rosetta Hemphill  MRN: [id number]  Today's Date: 9/15/2017    Admit Date: 9/14/2017          Discharge Needs Assessment       09/15/17 1033    Living Environment    Lives With facility resident    Living Arrangements extended care facility    Transportation Available ambulance;car;family or friend will provide    Living Environment    Provides Primary Care For no one, unable/limited ability to care for self    Quality Of Family Relationships supportive    Able to Return to Prior Living Arrangements yes   Plans to return to [facility]    Discharge Needs Assessment    Concerns To Be Addressed basic needs concerns    Anticipated Changes Related to Illness inability to care for self    Equipment Currently Used at Home wheelchair;walker, rolling    Discharge Planning Comments davion Hemphill [phone] (POA)            Discharge Plan       09/15/17 1036    Case Management/Social Work Plan    Plan Return to [facility] in [place], [place]    Patient/Family In Agreement With Plan yes    Additional Comments Spoke with patient and chelsie Gomez at bedside.  Patient was here recently for lf. partial hip replacement, then went to SNF in [place], [place].  Spoke with beckie Warren [phone], Fax#[phone].  Patient is from a skilled bed with no bedhold, but can return pending bed availability.  Patient was using a W/C or walker.  Plan is for her to return there - [facility] [place].  Packet started and in [place].  CCP will follow.          Discharge Placement     No information found                Demographic Summary       09/15/17 1031    Referral Information    Admission Type inpatient    Arrived From admitted as an inpatient;skilled nursing facility    Referral Source admission list    Reason For Consult discharge planning    Record Reviewed history and physical    Primary Care Physician  Information    Name Unsure of MD for rehab facility            Functional Status       09/15/17 1032    Functional Status Current    Ambulation 4-->completely dependent    Transferring 4-->completely dependent    Toileting 4-->completely dependent    Bathing 4-->completely dependent    Dressing 4-->completely dependent    Eating 2-->assistive person    Communication 2-->difficulty understanding and speaking (not related to language barrier)    Change in Functional Status Since Onset of Current Illness/Injury yes    Functional Status Prior    Ambulation 3-->assistive equipment and person    Transferring 3-->assistive equipment and person    Toileting 3-->assistive equipment and person    Bathing 2-->assistive person    Dressing 2-->assistive person    Eating 2-->assistive person    Communication 0-->understands/communicates without difficulty    IADL    Medications completely dependent    Meal Preparation completely dependent    Housekeeping completely dependent    Laundry completely dependent    Shopping completely dependent    Oral Care assistive person    Activity Tolerance    Current Activity Limitations bed rest;non-weight bearing, lower extremity left    Usual Activity Tolerance poor    Current Activity Tolerance poor    Cognitive/Perceptual/Developmental    Current Mental Status/Cognitive Functioning not oriented to time;not oriented to place            Psychosocial     None            Abuse/Neglect     None            Legal     None            Substance Abuse     None            Patient Forms     None          Becky S. [place], RN

## 2017-09-15 NOTE — CONSULTS
Patient Name: Rosetta Hemphill  :1930  87 y.o.    Date of Admission: 2017  Encounter Provider: Antoinette Olsen MD  Date of Encounter Visit: 09/15/17  Place of Service: [facility] CARDIOLOGY  Referring Provider: Guerita Galvan MD  Patient Care Team:  Guzman Watkins MD as PCP - General (Internal Medicine)      Chief complaint: hip dislocation    Reason for consultation: Elevated troponin at outlying hospital    History of Present Illness: Patient is an 87 year old female, who is only oriented to person. She has a reported history of HTN, HLD, stroke, and atrial fibrillation. She was admitted to [facility] in early August for hip fracture after a fall. She had a partial hip replacement on  by Dr. Greene and was discharged to rehab on 17. Per discharged summary- she was discharged on warfarin, ASA, lipitor, and coreg. INr was 1.8 at [facility].     Yesterday, patient sustained a hip dislocation. She was taken to a hospital in [place] ([facility]) where the dislocation was unable to be reduced. She reportedly had a troponin over 40. Troponin here is 3. EKG is V-paced/underlying atrial flutter.  She was transferred to [facility] for orthopedic care. She is a DNR/DNI. INR at [facility] was supratherapeutic. It is unclear wether she received anything to reverse it. INR here is 1.8    She opens her eyes but does not answer any of my questions.  There is no family available.  All I can say from a cardiac standpoint is that she does have a pacemaker and underlying atrial flutter which she was anticoagulated for but is now subtherapeutic.     Labs at [facility] are detailed below.     BNP 1600             [id number]              INR 4.1 ()          WBC 9.4  Cr 0.8 BUN 26      ALT 36               INR 7.1 ()          Hgb 10.6  Na 126                  Trop >40                                             Hct 32.2  K 5.7                     TSH 5.8                                                Plts 259  Albumin 2.8  Mag 1.5  Past Medical History:   Diagnosis Date   • Arthritis    • Asthma    • Atrial fibrillation    • Disease of thyroid gland    • GERD (gastroesophageal reflux disease)    • Hyperlipidemia    • Hypertension    • Stroke        Past Surgical History:   Procedure Laterality Date   • JOINT REPLACEMENT     • NE PARTIAL HIP REPLACEMENT Left 8/18/2017    Procedure: PARTIAL HIP REPLACEMENT;  Surgeon: Iam Greene MD;  Location: [facility];  Service: Orthopedics         Prior to Admission medications    Medication Sig Start Date End Date Taking? Authorizing Provider   acetaminophen (TYLENOL) 325 MG tablet Take 2 tablets by mouth Every 4 (Four) Hours As Needed for Mild Pain , Moderate Pain  or Headache. 8/21/17   Kye Tillman MD   aluminum-magnesium hydroxide-simethicone (MAALOX/MYLANTA) 200-200-20 MG/5ML suspension Take 15 mL by mouth Every 6 (Six) Hours As Needed for Indigestion or Heartburn.    Historical Provider, MD   amLODIPine (NORVASC) 5 MG tablet Take 5 mg by mouth Daily.    Historical Provider, MD   aspirin 81 MG EC tablet Take 81 mg by mouth Daily.    Historical Provider, MD   atorvastatin (LIPITOR) 80 MG tablet Take 80 mg by mouth Daily.    Historical Provider, MD   Biotin 10 MG tablet Take 5 mg by mouth Daily.    Historical Provider, MD   bisacodyl (DULCOLAX) 5 MG EC tablet Take 5 mg by mouth Daily As Needed for Constipation.    Historical Provider, MD   carvedilol (COREG) 12.5 MG tablet Take 12.5 mg by mouth 2 (Two) Times a Day With Meals.    Historical Provider, MD   fluticasone (FLONASE) 50 MCG/ACT nasal spray 1 spray into each nostril Daily As Needed for Rhinitis.    Historical Provider, MD   folic acid (FOLVITE) 1 MG tablet Take 1 mg by mouth Daily.    Historical Provider, MD   gabapentin (NEURONTIN) 100 MG capsule Take 100 mg by mouth Daily.    Historical Provider, MD   levothyroxine (SYNTHROID, LEVOTHROID) 75 MCG tablet  Take 125 mcg by mouth Daily.    Historical Provider, MD   lidocaine (LIDODERM) 5 % Place 1 patch on the skin 2 (Two) Times a Day. Remove & Discard patch within 12 hours or as directed by MD Li Provider, MD   magnesium hydroxide (MILK OF MAGNESIA) 400 MG/5ML suspension Take 30 mL by mouth Daily As Needed for Constipation.    Historical Provider, MD   Magnesium Sulfate in D5W (MAGNESIUM SULFATE 1G IN 0.9% NACL) 10-5 MG/ML-% IVPB Infuse  into a venous catheter 1 (One) Time.    Historical Provider, MD   montelukast (SINGULAIR) 10 MG tablet Take 10 mg by mouth Every Night.    Historical Provider, MD   ondansetron (ZOFRAN) 4 MG/5ML solution Infuse 4 mg into a venous catheter Every 8 (Eight) Hours As Needed for Nausea or Vomiting.    Historical Provider, MD   pantoprazole (PROTONIX) 40 MG EC tablet Take 40 mg by mouth Daily.    Historical Provider, MD   potassium chloride ER (K-TAB) 20 MEQ tablet controlled-release ER tablet Take 20 mEq by mouth 2 (Two) Times a Day.    Historical Provider, MD   sertraline (ZOLOFT) 50 MG tablet Take 50 mg by mouth Daily.    Historical Provider, MD   sodium chloride 0.9 % solution 50 mL with Morphine 15 MG/ML solution 2 mg/mL Infuse 2 mg/hr into a venous catheter Every 4 (Four) Hours As Needed.    Historical Provider, MD   vitamin B-12 (CYANOCOBALAMIN) 1000 MCG tablet Take 1,000 mcg by mouth Daily.    Historical Provider, MD   warfarin (COUMADIN) 5 MG tablet Take 5.5 mg by mouth Daily. Currently suspended    Historical Provider, MD       No Known Allergies    Social History     Social History   • Marital status:      Spouse name: N/A   • Number of children: N/A   • Years of education: N/A     Social History Main Topics   • Smoking status: Never Smoker   • Smokeless tobacco: None   • Alcohol use No   • Drug use: No   • Sexual activity: Defer     Other Topics Concern   • None     Social History Narrative       History reviewed. No pertinent family history.    REVIEW OF  "SYSTEMS:   All other systems reviewed and negative.       Objective:     Vitals:    09/15/17 0003 09/15/17 0700 09/15/17 0746   BP: 127/66  137/75   BP Location: Left arm  Left arm   Patient Position: Lying  Lying   Pulse: 69 69 69   Resp: 18  18   Temp: 98 °F (36.7 °C)  97.2 °F (36.2 °C)   TempSrc: Oral  Oral   SpO2: 92%  94%   Weight: 149 lb 14.6 oz (68 kg)     Height: 62\" (157.5 cm)       Body mass index is 27.42 kg/(m^2).    Constitutional: She opens her eyes.  She looks chronically ill.  HENT:   Head: Normocephalic and atraumatic. Head is without contusion.   Nose: No nasal deformity. No epistaxis.   Eyes: Lids are normal. Right eye exhibits no exudate. Left eye exhibits no exudate.  Neck: No JVD present. Carotid bruit is not present. No tracheal deviation present. No thyroid mass and no thyromegaly present.   Cardiovascular: Normal rate, regular rhythm and normal heart sounds.    Pulses:       Posterior tibial pulses are 2+ on the right side, and 2+ on the left side.   Pulmonary/Chest: Effort normal and breath sounds normal.   Abdominal: Soft. Normal appearance and bowel sounds are normal. There is no tenderness.   Neurological: Only opens eyes does not respond to questions.    Skin: Skin is warm, dry and intact. No rash noted.   Psychiatric: Opens eyes.  Does not respond to questions.  Vitals reviewed      Lab Review:       Results from last 7 days  Lab Units 09/15/17  0635   SODIUM mmol/L 130*   POTASSIUM mmol/L 4.6   CHLORIDE mmol/L 94*   CO2 mmol/L 20.3*   BUN mg/dL 29*   CREATININE mg/dL 0.83   CALCIUM mg/dL 9.6   GLUCOSE mg/dL 112*       Results from last 7 days  Lab Units 09/15/17  0635   TROPONIN T ng/mL 3.050*       Results from last 7 days  Lab Units 09/15/17  0635   WBC 10*3/mm3 12.45*   HEMOGLOBIN g/dL 10.2*   HEMATOCRIT % 31.8*   PLATELETS 10*3/mm3 222                  I personally viewed and interpreted the patient's EKG/Telemetry data.        Assessment and Plan:       1.  Non-ST elevation " myocardial infarction.  She is not an interventional candidate.  I recommend continuing aspirin.  Consider Lovenox if no surgical intervention is planned.  Heart rate and blood pressure controlled.  2.  Pacemaker in place and appears to be functioning normally.  3.  Underlying atrial fib/flutter.  Off anticoagulation.  Rate controlled.  4.  Dementia  5.  Dislocated hip.  Surgical intervention is necessary, I recommend proceeding despite cardiac risk.  Her risk factors are not modifiable.  6.  DNR/DNI.  I would actively consider palliative care at this point.  I have nothing else to offer her from a cardiac standpoint.    We will try to see to her local cardiologist is.    Antoinette Olsen MD  09/15/17  9:03 AM

## 2017-09-15 NOTE — BRIEF OP NOTE
HIP CLOSED REDUCTION  Procedure Note    Rosetta Hemphill  9/14/2017 - 9/15/2017    Pre-op Diagnosis:   Dislocation left hip bipolar prosthesis    Post-op Diagnosis:     Dislocation left hip bipolar prosthesis  Procedure/CPT® Codes:      Procedure(s):  LT HIP CLOSED REDUCTION    Surgeon(s):  Iam Greene MD    Anesthesia: General    Staff:   Circulator: Phoebe Potter RN  Radiology Technologist: Mala Urban  Orderly: Vinod Kat    Estimated Blood Loss: *No blood loss documented*  Urine Voided: * No values recorded between 9/15/2017  4:42 PM and 9/15/2017  5:12 PM *    Specimens:                * No specimens in log *      Drains:       [REMOVED] Urethral Catheter 08/19/17 1800 100% silicone 16 5 10 (Removed)   Removed 08/21/17 0530   Daily Indications Acute retention 8/21/2017  8:14 AM   Securement secured to upper leg with adhesive device 8/21/2017  8:14 AM   Catheter care done Yes 8/20/2017 11:56 PM   Tolerance no signs/symptoms of discomfort 8/20/2017 11:56 PM   Urine Output (mL) 175 8/20/2017 11:00 PM       Findings: see dict     Complications: nil      Iam Greene MD     Date: 9/15/2017  Time: 5:19 PM

## 2017-09-15 NOTE — ANESTHESIA PREPROCEDURE EVALUATION
Anesthesia Evaluation     NPO Solid Status: > 8 hours       Airway   Mallampati: II  TM distance: >3 FB  Neck ROM: full  no difficulty expected  Dental - normal exam     Pulmonary - normal exam   (+) asthma,   Cardiovascular     ECG reviewed  Rhythm: regular    (+) past MI , dysrhythmias, hyperlipidemia  (-) murmur, carotid bruits    ROS comment: Afib/flutter w vent pacing    Neuro/Psych  (+) CVA,      ROS Comment: dimentia- will respond to verbal but not conversive  GI/Hepatic/Renal/Endo    (+)  GERD,     Musculoskeletal     Abdominal  - normal exam   Substance History      OB/GYN          Other                                        Anesthesia Plan    ASA 4     general   (  D/W R&B of GA including but not limited to: heart, lung, liver, kidney, neurologic problems, positioning injuries, dental damage, corneal abrasion and TMJ.  .)  intravenous induction   Anesthetic plan and risks discussed with patient.

## 2017-09-15 NOTE — PLAN OF CARE
Problem: Patient Care Overview (Adult)  Goal: Plan of Care Review  Outcome: Ongoing (interventions implemented as appropriate)    09/15/17 0450   Coping/Psychosocial Response Interventions   Plan Of Care Reviewed With patient   Patient Care Overview   Progress no change   Outcome Evaluation   Outcome Summary/Follow up Plan Pt. admitted to unit from [facility] to manage hip dislocations. Pt. did have an MI there with card. enz. >40. (trop) She is stable with V-paced beats. O2 turned down to 2-lit N/C. General 2+ edema noted richard. thighs. Potential for skin tears is very high. Pt. c/o pain only when she is repositioned. N/S @ 50ml/hr. initiated per orders. Labs in the AM. Consults to Ortho. surg, and Cardiac secured. Will monitor her for change in MS and s/s of sepsis and skin breakdown. VSS.          Problem: Confusion, Chronic (Adult)  Goal: Identify Related Risk Factors and Signs and Symptoms  Outcome: Ongoing (interventions implemented as appropriate)  Goal: Cognitive/Functional Impairments Minimized  Outcome: Ongoing (interventions implemented as appropriate)  Goal: Free from Harm/Injuries  Outcome: Ongoing (interventions implemented as appropriate)    Problem: Infection, Risk/Actual (Adult)  Goal: Identify Related Risk Factors and Signs and Symptoms  Outcome: Ongoing (interventions implemented as appropriate)  Goal: Infection Prevention/Resolution  Outcome: Ongoing (interventions implemented as appropriate)    Problem: Pain, Acute (Adult)  Goal: Identify Related Risk Factors and Signs and Symptoms  Outcome: Ongoing (interventions implemented as appropriate)  Goal: Acceptable Pain Control/Comfort Level  Outcome: Ongoing (interventions implemented as appropriate)    09/15/17 0450   Pain, Acute (Adult)   Acceptable Pain Control/Comfort Level making progress toward outcome

## 2017-09-15 NOTE — CONSULTS
"Adult Nutrition  Assessment/PES    Patient Name:  Rosetta Hemphill  YOB: 1930  MRN: [id number]  Admit Date:  9/14/2017    Assessment Date:  9/15/2017        Reason for Assessment       09/15/17 1141    Reason for Assessment    Reason For Assessment/Visit identified at risk by screening criteria    Diagnosis Diagnosis    Cardiac HTN    Neurological CVA    Ortho Fracture   hip fx and dislocated              Nutrition/Diet History       09/15/17 1142    Nutrition/Diet History    Typical Food/Fluid Intake decrease a this time            Anthropometrics       09/15/17 1142    Anthropometrics    Height 157.5 cm (62.01\")    RD Documented Current Weight  68 kg (149 lb 14.6 oz)    Ideal Body Weight (IBW)    Ideal Body Weight (IBW), Female 50.85    Body Mass Index (BMI)    BMI Grade 25 - 29.9 - overweight            Labs/Tests/Procedures/Meds       09/15/17 1143    Labs/Tests/Procedures/Meds    Diagnostic Test/Procedure Review reviewed    Labs/Tests Review Reviewed;Na+;Glucose;BUN;Hgb Hct;WBC    Medication Review Reviewed, pertinent;Antacid   folic acid, B12    Significant Vitals reviewed            Physical Findings       09/15/17 1143    Physical Findings/Assessment    Additional Documentation Physical Appearance (Group)    Physical Appearance    Skin surgical wound;poor skin integrity/turgor;edema            Estimated/Assessed Needs       09/15/17 1144    Calculation Measurements    Weight Used For Calculations 68 kg (149 lb 14.6 oz)    Estimated/Assessed Energy Needs    Energy Need Method Kcal/kg    kcal/kg 30    30 Kcal/Kg (kcal) 2040    Estimated Kcal Range  7385-7338    Estimated/Assessed Protein Needs    Weight Used for Protein Calculation 68 kg (149 lb 14.6 oz)    Protein (gm/kg) 1.2    1.2 Gm Protein (gm) 81.6    Estimated/Assessed Fluid Needs    Fluid Need Method RDA method    RDA Method (mL)  1700            Nutrition Prescription Ordered       09/15/17 1144    Nutrition Prescription PO    Current PO " Diet Regular    Common Modifiers Cardiac;Renal                Problem/Interventions:        Problem 1       09/15/17 1145    Nutrition Diagnoses Problem 1    Problem 1 Nutrition Appropriate for Condition at this Time    Etiology (related to) Medical Diagnosis    Ortho Fracture   and dislocated                    Intervention Goal       09/15/17 1146    Intervention Goal    General Maintain nutrition    PO PO intake (%)    PO Intake % 80 %    Weight Maintain weight            Nutrition Intervention       09/15/17 1146    Nutrition Intervention    RD/Tech Action Follow Tx progress;Care plan reviewd;Encourage intake            Nutrition Prescription       09/15/17 1146    Nutrition Prescription PO    PO Prescription Begin/change supplement    Supplement Ensure Enlive    Supplement Frequency Daily              Electronically signed by:  Leora Briggs RD  09/15/17 11:47 AM

## 2017-09-15 NOTE — PLAN OF CARE
Problem: Patient Care Overview (Adult)  Goal: Plan of Care Review  Outcome: Ongoing (interventions implemented as appropriate)    09/15/17 1443   Coping/Psychosocial Response Interventions   Plan Of Care Reviewed With patient   Outcome Evaluation   Outcome Summary/Follow up Plan Pt  c/o pain and was medicated,seen by the MD,meds were reviewed,family members by her bed side,vss are stable,pt will be taking down to surgery for close reduction left hip dislocation by DR Emerson,consent form was btained via phone& witness by 2nd RN,will continue to monitor.       Goal: Adult Individualization and Mutuality  Outcome: Ongoing (interventions implemented as appropriate)    Problem: Confusion, Chronic (Adult)  Goal: Identify Related Risk Factors and Signs and Symptoms  Outcome: Ongoing (interventions implemented as appropriate)  Goal: Cognitive/Functional Impairments Minimized  Outcome: Ongoing (interventions implemented as appropriate)  Goal: Free from Harm/Injuries  Outcome: Ongoing (interventions implemented as appropriate)    Problem: Infection, Risk/Actual (Adult)  Goal: Identify Related Risk Factors and Signs and Symptoms  Outcome: Ongoing (interventions implemented as appropriate)  Goal: Infection Prevention/Resolution  Outcome: Ongoing (interventions implemented as appropriate)    Problem: Pain, Acute (Adult)  Goal: Identify Related Risk Factors and Signs and Symptoms  Outcome: Ongoing (interventions implemented as appropriate)  Goal: Acceptable Pain Control/Comfort Level  Outcome: Ongoing (interventions implemented as appropriate)

## 2017-09-15 NOTE — ANESTHESIA POSTPROCEDURE EVALUATION
Patient: Rosetta Hemphill    Procedure Summary     Date Anesthesia Start Anesthesia Stop Room / Location    09/15/17 3539 [id number]  [facility] /  [facility]       Procedure Diagnosis Surgeon Provider    LT HIP CLOSED REDUCTION (Left Hip) No diagnosis on file. MD David Kessler MD          Anesthesia Type: general  Last vitals  BP   116/59 (09/15/17 1745)    Temp   36.6 °C (97.8 °F) (09/15/17 1727)    Pulse   69 (09/15/17 1745)   Resp   16 (09/15/17 1745)    SpO2   95 % (09/15/17 1745)      Post Anesthesia Care and Evaluation    Patient location during evaluation: PACU  Patient participation: complete - patient participated  Level of consciousness: awake and alert  Pain management: adequate  Airway patency: patent  Anesthetic complications: No anesthetic complications    Cardiovascular status: acceptable  Respiratory status: acceptable  Hydration status: acceptable    Comments: --------------------            09/15/17               1745     --------------------   BP:       116/59     Pulse:      69       Resp:       16       Temp:                SpO2:      95%      --------------------

## 2017-09-15 NOTE — NURSING NOTE
Pt. Arrived from [facility] in [place] by ambulance.  Received report from ZAIRA Johnson in the ICU department.  Pertinent information given is as follows:  Pt. Is a DNR.  Had partial left hip replacement 3 weeks ago, d.t. Repeated dislocations.  Left hip is currently dislocated again.  There are 2 steri-strips still present on Left hip.  Pt. Coumadin therapy has been withheld  Since 9/13/17.  She has a pace maker, currently HR 70's.  She is on 3-lit NC with 95% O2 SATs.   She was given a swallow study and put on mechanical soft diet. (has upper and lower dentures).  VSS.  Patient is oriented to self (hx of dementia) and is incontinent. No pressure ulcers noted.      Several of her labs were flagged including a BNP of 1600,  Troponin >40,   PT was 39.6 on 9/13/17.  INR was 4.1    Other labs are included in her chart.

## 2017-09-15 NOTE — H&P
Name: Rosetta eHmphill ADMIT: 2017   : 1930  PCP: Guzman Watkins MD    MRN: [id number] LOS: 1 days   AGE/SEX: 87 y.o. female  ROOM: [facility]/     No chief complaint on file.        Patient is a 87 y.o. woman who was discharged from our facility on 17 following partial left hip replacement.  She had had a subcapital hip fracture and was discharged to rehabilitation.  I was called earlier this evening because of patient's hip dislocation.  This had happened earlier but had been able to be replaced.  When it happened again, repeated attempts were unsuccessful.  There is no orthopedic surgeon at the [facility] so the patient was sent here.  She was reported to have had an MI yesterday.  There is no discharge summary that was sent with her; however, there are about 50 pages of other data including pharmacy orders, medications, EKG copies and labs.  Patient is sedated.  She just arrived to the hospital here at about 1 AM.  She briefly woke up and answered yes when asked if she was having pain that then fell back asleep and could not stay awake to answer any other questions      Past Medical History:   Diagnosis Date   • Arthritis    • Asthma    • Atrial fibrillation    • Disease of thyroid gland    • GERD (gastroesophageal reflux disease)    • Hyperlipidemia    • Hypertension    • Stroke        Past Surgical History:   Procedure Laterality Date   • JOINT REPLACEMENT     • PA PARTIAL HIP REPLACEMENT Left 2017    Procedure: PARTIAL HIP REPLACEMENT;  Surgeon: Iam Greene MD;  Location: [facility];  Service: Orthopedics       Prescriptions Prior to Admission   Medication Sig Dispense Refill Last Dose   • acetaminophen (TYLENOL) 325 MG tablet Take 2 tablets by mouth Every 4 (Four) Hours As Needed for Mild Pain , Moderate Pain  or Headache.  0 Unknown at Unknown time   • aluminum-magnesium hydroxide-simethicone (MAALOX/MYLANTA) 200-200-20 MG/5ML suspension Take 15 mL by mouth Every 6  (Six) Hours As Needed for Indigestion or Heartburn.   Unknown at Unknown time   • amLODIPine (NORVASC) 5 MG tablet Take 5 mg by mouth Daily.   Unknown at Unknown time   • aspirin 81 MG EC tablet Take 81 mg by mouth Daily.   Unknown at Unknown time   • atorvastatin (LIPITOR) 80 MG tablet Take 80 mg by mouth Daily.   Unknown at Unknown time   • Biotin 10 MG tablet Take 5 mg by mouth Daily.   Unknown at Unknown time   • bisacodyl (DULCOLAX) 5 MG EC tablet Take 5 mg by mouth Daily As Needed for Constipation.   Unknown at Unknown time   • carvedilol (COREG) 12.5 MG tablet Take 12.5 mg by mouth 2 (Two) Times a Day With Meals.   Unknown at Unknown time   • fluticasone (FLONASE) 50 MCG/ACT nasal spray 1 spray into each nostril Daily As Needed for Rhinitis.   Unknown at Unknown time   • folic acid (FOLVITE) 1 MG tablet Take 1 mg by mouth Daily.   Unknown at Unknown time   • gabapentin (NEURONTIN) 100 MG capsule Take 100 mg by mouth Daily.   Unknown at Unknown time   • levothyroxine (SYNTHROID, LEVOTHROID) 75 MCG tablet Take 125 mcg by mouth Daily.   Unknown at Unknown time   • lidocaine (LIDODERM) 5 % Place 1 patch on the skin 2 (Two) Times a Day. Remove & Discard patch within 12 hours or as directed by MD   Unknown at Unknown time   • magnesium hydroxide (MILK OF MAGNESIA) 400 MG/5ML suspension Take 30 mL by mouth Daily As Needed for Constipation.   Unknown at Unknown time   • Magnesium Sulfate in D5W (MAGNESIUM SULFATE 1G IN 0.9% NACL) 10-5 MG/ML-% IVPB Infuse  into a venous catheter 1 (One) Time.   Unknown at Unknown time   • montelukast (SINGULAIR) 10 MG tablet Take 10 mg by mouth Every Night.   Unknown at Unknown time   • ondansetron (ZOFRAN) 4 MG/5ML solution Infuse 4 mg into a venous catheter Every 8 (Eight) Hours As Needed for Nausea or Vomiting.   Unknown at Unknown time   • pantoprazole (PROTONIX) 40 MG EC tablet Take 40 mg by mouth Daily.   Unknown at Unknown time   • potassium chloride ER (K-TAB) 20 MEQ tablet  controlled-release ER tablet Take 20 mEq by mouth 2 (Two) Times a Day.   Unknown at Unknown time   • sertraline (ZOLOFT) 50 MG tablet Take 50 mg by mouth Daily.   Unknown at Unknown time   • sodium chloride 0.9 % solution 50 mL with Morphine 15 MG/ML solution 2 mg/mL Infuse 2 mg/hr into a venous catheter Every 4 (Four) Hours As Needed.   Unknown at Unknown time   • vitamin B-12 (CYANOCOBALAMIN) 1000 MCG tablet Take 1,000 mcg by mouth Daily.   Unknown at Unknown time   • warfarin (COUMADIN) 5 MG tablet Take 5.5 mg by mouth Daily. Currently suspended   Unknown at Unknown time     Allergies:  Review of patient's allergies indicates no known allergies.    Social History   Substance Use Topics   • Smoking status: Never Smoker   • Smokeless tobacco: None   • Alcohol use No   Patient was at a rehabilitation facility close to the [facility]    Family history unobtainable and not previously documented    Review of Systems-  unobtainable      Vital Signs  Temp:  [98 °F (36.7 °C)] 98 °F (36.7 °C)  Heart Rate:  [69] 69  Resp:  [18] 18  BP: (127)/(66) 127/66  SpO2:  [92 %] 92 %  on  Flow (L/min):  [3-3.5] 3;   O2 Device: nasal cannula  Body mass index is 27.42 kg/(m^2).    Physical Exam   Constitutional: She appears well-developed and well-nourished.   Elderly woman.  Sedated   HENT:   Head: Normocephalic.   Right Ear: External ear normal.   Left Ear: External ear normal.   Nose: Nose normal.   Mouth/Throat: No oropharyngeal exudate.   Very dry mucosa.  Slight flattening of the nasolabial fold on the right   Eyes: Conjunctivae are normal. Pupils are equal, round, and reactive to light. Right eye exhibits no discharge. Left eye exhibits no discharge. No scleral icterus.   She opened her eyes briefly.  I did not see her tracking any objects because she fell asleep so quickly   Neck: Normal range of motion. Neck supple. No JVD present. No tracheal deviation present. No thyromegaly present.   Cardiovascular:   No  murmur heard.  Irregular.  Very weak pedal pulse on the left posterior tibial.  I don't feel a dorsalis pedis and the usual location is right where the larger bruise is.  Dorsalis pedis easily palpable on the right   Pulmonary/Chest: No stridor. No respiratory distress. She has no wheezes. She has no rales.   Breath sounds decreased but equal and fairly clear   Abdominal: Soft. Bowel sounds are normal. She exhibits no distension. There is no tenderness.   No hepatosplenomegaly   Musculoskeletal: She exhibits deformity. She exhibits no edema.   Thickening of the PIP joints.  Large bruise at the left foot dorsum   Lymphadenopathy:     She has no cervical adenopathy.   Neurological:   Sedated.  Unable to answer questions or cooperate with exam   Skin: Skin is warm and dry.   Nursing note and vitals reviewed.      Results Review:   I reviewed the patient's new clinical results.    [facility]  Sodium 126 potassium 5.7 chloride 94 bicarbonate 23 glucose 112 BUN 21 creatinine 0.8 albumin 2.8  alkaline phosphatase 150 troponin greater than 40   TSH 5.8 B12 338 folate 8.3  INR 4.1   White count 9.4 hemoglobin 10.6 platelets 259        CT of the head, CTA of the chest and pain films of the hip and pelvis were done.  There are no written reports but the disks were sent.      EKG- paced with flutter    Assessment/Plan   1.  Dislocated left hip ×2 with left partial hip replacement 8/18/2017.  The hip could not be put back in place despite repeated attempts at the [facility] facility in [place].  She was therefore transferred here.  Consult orthopedics.  2.  MI yesterday with troponin of greater than 40.  Medical management.  Family is not here however the ER physician stated that the patient is DNR/DNI.  Continue aspirin, statin, beta blocker.  Ask cardiology to see  3.  Atrial fibrillation/flutter.  Rate is okay.  Hold Coumadin secondary to elevated INR  4.  Hypertension  5.  Anemia  6.  Stroke after  kyphoplasty  7.  Coagulopathy from Coumadin  8.  Hyperkalemia from potassium replacement.  Potassium stopped.  Recheck labs in a.m.    I discussed the patients findings and my recommendations with patient's nurse.  I discussed with the ER physician in [place].  No family members are present due to the late hour.    Guerita Galvan MD  [facility]  09/15/17  1:51 AM

## 2017-09-15 NOTE — CONSULTS
Orthopedic Consult    Patient: Rosetta Hemphill                                           YOB: 1930     Date of Admission: 9/14/2017  7:51 PM            Medical Record Number: [id number]    Attending Physician: Gregory Abbott MD    Consulting Physician: Iam Greene MD    Reason for Consult: dislocation left hip bipolar prosthesis for hip fracture.    History of Present Illness: 87 y.o. female admitted to [facility] with Dislocated hip, left, initial encounter [S73.005A].   The patient was evaluated in the emergency room in [facility] and was diagnosed with a  hip dislocation. This is her second dislocation.  She underwent a close reduction about 2 weeks back, but the ER was unable to reduce this time.  She is known to me from her left bipolar arthroplasty about one month back.  She has a history of dementia. Secondary to the age and multiple medical co morbidities the patient was admitted to the hospitalist. According to the family.  The patient has been ambulating with the help of a walker and was getting stronger.  She has not walked for about 2 days prior to this admission. Denies any history of loss of consciousness, headache, vomiting, or seizures.   Denies any other injuries. The patient is accompanied by  family members  to this hospital visit.   The patient denies any prior  pre-existing pain in the hip or prior use of any assistive device.   The patient  lives at nursing home   and is dependent for  activities of daily living.  The patient has a  history of dementia.    Patient denies any history of: DVT/PE, MRSA, COPD, CHF, CAD, Diabetes mellitus, Dementia .   The patient has history of : atrial fibrillation, stroke  The patient is on anticoagulants: Coumadin.  This has been held since admission.    Past medical history, past surgical history, social history, family history, ALLERGIES, current medications have been reviewed by me.    Past Medical History:   Diagnosis Date    • Arthritis    • Asthma    • Atrial fibrillation    • Disease of thyroid gland    • GERD (gastroesophageal reflux disease)    • Hyperlipidemia    • Hypertension    • Stroke      Past Surgical History:   Procedure Laterality Date   • JOINT REPLACEMENT     • TX PARTIAL HIP REPLACEMENT Left 8/18/2017    Procedure: PARTIAL HIP REPLACEMENT;  Surgeon: Iam Greene MD;  Location: [facility] OR;  Service: Orthopedics     Social History     Occupational History   • Not on file.     Social History Main Topics   • Smoking status: Never Smoker   • Smokeless tobacco: Not on file   • Alcohol use No   • Drug use: No   • Sexual activity: Defer      Social History     Social History Narrative     History reviewed. No pertinent family history.     No Known Allergies    Home Medications:  Prescriptions Prior to Admission   Medication Sig Dispense Refill Last Dose   • acetaminophen (TYLENOL) 325 MG tablet Take 2 tablets by mouth Every 4 (Four) Hours As Needed for Mild Pain , Moderate Pain  or Headache.  0 Unknown at Unknown time   • aluminum-magnesium hydroxide-simethicone (MAALOX/MYLANTA) 200-200-20 MG/5ML suspension Take 15 mL by mouth Every 6 (Six) Hours As Needed for Indigestion or Heartburn.   Unknown at Unknown time   • amLODIPine (NORVASC) 5 MG tablet Take 5 mg by mouth Daily.   Unknown at Unknown time   • aspirin 81 MG EC tablet Take 81 mg by mouth Daily.   Unknown at Unknown time   • atorvastatin (LIPITOR) 80 MG tablet Take 80 mg by mouth Daily.   Unknown at Unknown time   • Biotin 10 MG tablet Take 5 mg by mouth Daily.   Unknown at Unknown time   • bisacodyl (DULCOLAX) 5 MG EC tablet Take 5 mg by mouth Daily As Needed for Constipation.   Unknown at Unknown time   • carvedilol (COREG) 12.5 MG tablet Take 12.5 mg by mouth 2 (Two) Times a Day With Meals.   Unknown at Unknown time   • fluticasone (FLONASE) 50 MCG/ACT nasal spray 1 spray into each nostril Daily As Needed for Rhinitis.   Unknown at Unknown time   •  folic acid (FOLVITE) 1 MG tablet Take 1 mg by mouth Daily.   Unknown at Unknown time   • gabapentin (NEURONTIN) 100 MG capsule Take 100 mg by mouth Daily.   Unknown at Unknown time   • levothyroxine (SYNTHROID, LEVOTHROID) 75 MCG tablet Take 125 mcg by mouth Daily.   Unknown at Unknown time   • lidocaine (LIDODERM) 5 % Place 1 patch on the skin 2 (Two) Times a Day. Remove & Discard patch within 12 hours or as directed by MD   Unknown at Unknown time   • magnesium hydroxide (MILK OF MAGNESIA) 400 MG/5ML suspension Take 30 mL by mouth Daily As Needed for Constipation.   Unknown at Unknown time   • Magnesium Sulfate in D5W (MAGNESIUM SULFATE 1G IN 0.9% NACL) 10-5 MG/ML-% IVPB Infuse  into a venous catheter 1 (One) Time.   Unknown at Unknown time   • montelukast (SINGULAIR) 10 MG tablet Take 10 mg by mouth Every Night.   Unknown at Unknown time   • ondansetron (ZOFRAN) 4 MG/5ML solution Infuse 4 mg into a venous catheter Every 8 (Eight) Hours As Needed for Nausea or Vomiting.   Unknown at Unknown time   • pantoprazole (PROTONIX) 40 MG EC tablet Take 40 mg by mouth Daily.   Unknown at Unknown time   • potassium chloride ER (K-TAB) 20 MEQ tablet controlled-release ER tablet Take 20 mEq by mouth 2 (Two) Times a Day.   Unknown at Unknown time   • sertraline (ZOLOFT) 50 MG tablet Take 50 mg by mouth Daily.   Unknown at Unknown time   • sodium chloride 0.9 % solution 50 mL with Morphine 15 MG/ML solution 2 mg/mL Infuse 2 mg/hr into a venous catheter Every 4 (Four) Hours As Needed.   Unknown at Unknown time   • vitamin B-12 (CYANOCOBALAMIN) 1000 MCG tablet Take 1,000 mcg by mouth Daily.   Unknown at Unknown time   • warfarin (COUMADIN) 5 MG tablet Take 5.5 mg by mouth Daily. Currently suspended   Unknown at Unknown time       Current Medications:  Scheduled Meds:  amLODIPine 5 mg Oral Daily   [MAR Hold] aspirin 81 mg Oral Daily   [MAR Hold] atorvastatin 80 mg Oral Daily   carvedilol 12.5 mg Oral BID With Meals   [MAR Hold]  "folic acid 1 mg Oral Daily   gabapentin 100 mg Oral Daily   [MAR Hold] levothyroxine 125 mcg Oral Daily   [MAR Hold] lidocaine 1 patch Transdermal BID   [MAR Hold] montelukast 10 mg Oral Nightly   [MAR Hold] pantoprazole 40 mg Oral Daily   [MAR Hold] sertraline 50 mg Oral Daily   [MAR Hold] vitamin B-12 1,000 mcg Oral Daily     Continuous Infusions:  lactated ringers 9 mL/hr    sodium chloride 50 mL/hr Last Rate: 50 mL/hr (09/15/17 0303)     PRN Meds:.•  [MAR Hold] acetaminophen  •  [MAR Hold] fluticasone  •  lidocaine PF 1%  •  [MAR Hold] Morphine **AND** [MAR Hold] naloxone  •  [MAR Hold] ondansetron  •  [MAR Hold] sodium chloride  •  sodium chloride    Review of Systems:   A 12 point system review was reviewed with the patient and from the chart  and is negative except as in history of present illness.      Physical Exam: 87 y.o. female                    Vitals:    09/15/17 0700 09/15/17 0746 09/15/17 1142 09/15/17 1609   BP:  137/75  120/67   BP Location:  Left arm  Right arm   Patient Position:  Lying  Lying   Pulse: 69 69  73   Resp:  18  12   Temp:  97.2 °F (36.2 °C)  97.8 °F (36.6 °C)   TempSrc:  Oral  Oral   SpO2:  94%  93%   Weight:       Height:   62.01\" (157.5 cm)         Gait: Could not be tested , patient is nonambulatory.    Mental/HEENT/cardio/skin: The patient's general appearance was well-nourished, well-hydrated, no acute distress.  Orientation was alert and oriented ×3.  The patient's mood was normal.   Pulmonary exam shows normal late exchange, no labored breathing, or shortness of breath.    The skin exam showed normal temperature and color in the area of examination.    Extremities: left   lower extremity positive shortening, positive  Flexion, adduction, internal rotation, attempted movements of the  hip are painful and restricted. The patient is able to do gentle active range of motion of her toes. Gross sensation is intact over the toes.    Pulses: Pulses palpable and equal " bilaterally    Diagnostic Tests:      Results from last 7 days  Lab Units 09/15/17  0635   WBC 10*3/mm3 12.45*   HEMOGLOBIN g/dL 10.2*   HEMATOCRIT % 31.8*   PLATELETS 10*3/mm3 222       Results from last 7 days  Lab Units 09/15/17  0635   SODIUM mmol/L 130*   POTASSIUM mmol/L 4.6   CHLORIDE mmol/L 94*   CO2 mmol/L 20.3*   BUN mg/dL 29*   CREATININE mg/dL 0.83   GLUCOSE mg/dL 112*   CALCIUM mg/dL 9.6       Results from last 7 days  Lab Units 09/15/17  0817   INR  4.40*     No results found for: CRYSTAL]  No results found for: CULTURE]  No results found for: URICACID]  Ct Head Without Contrast    Result Date: 8/17/2017  Narrative: CT HEAD WITHOUT CONTRAST  HISTORY: Slurred speech, confusion.  TECHNIQUE: A noncontrasted CT examination of the brain was performed and compared to the CT examination of 08/16/2017.  FINDINGS: Again identified are areas of decreased attenuation involving the right parietal and occipital lobes posterolaterally extending to the superior and posterior aspect of the right temporal lobe suggesting an acute to subacute infarct. Smaller areas of decreased attenuation involving the right frontal lobe laterally and involving the insular cortex on the right are appreciated also present previously and unchanged. No new area of decreased attenuation to suggest acute infarction is identified. There is no evidence of hemorrhage. Further evaluation could be performed with a MRI examination of the brain as indicated.  The above information was called to the patient's nurse at time of the dictation. The patient's nurse is to immediately relay the information to the clinical service.  This report was finalized on 8/17/2017 12:49 PM by Dr. Mika Delgadillo MD.      Ct Head Without Contrast    Result Date: 8/17/2017  Narrative: CT HEAD WITHOUT CONTRAST  HISTORY: Fall, hit head, headache, on Coumadin.  COMPARISON: None.  FINDINGS: Bone windows showed no evidence of a calvarial fracture. The brain ventricles are  symmetrical. There is moderate atrophy. Moderate facet calcification is noted.  An air-fluid level is present in the right maxillary sinus with moderate mucosal thickening involving the right maxillary sinus present.  There is an area decreased attenuation involving the right parietal lobe laterally suspicious for a moderate size acute to subacute infarct. This measures approximately 5 cm in AP dimension. A smaller area of decreased attenuation is appreciated involving the cortex of the right frontal lobe superiolaterally measuring approximately 1.2 cm in transverse dimension. There is no evidence of hemorrhage. A small scalp hematoma is noted in the left parietal region.  Further evaluation could be performed with MRI examination of the brain as indicated.  The above information was called to and discussed with Dr. Patiño.  This report was finalized on 8/17/2017 6:57 AM by Dr. Mika Delgadillo MD.      Xr Hip With Or Without Pelvis 1 View Left    Result Date: 8/18/2017  Narrative: XR HIP W OR WO PELVIS 1 VIEW LEFT-  INDICATIONS: Postoperative evaluation.  TECHNIQUE:     Frontal views of the pelvis and left hip  COMPARISON: None available  FINDINGS:   Intact appearing left proximal femoral hemiarthroplasty hardware is seen with adjacent surgical soft tissue gas,  overlying skin staples. No acute fracture is identified.       Impression:  Postsurgical changes.    This report was finalized on 8/18/2017 4:28 PM by Dr. Jac Peter MD.      Xr Hip With Or Without Pelvis 2 - 3 View Left    Result Date: 9/15/2017  Narrative: EMERGENCY IMAGING LEFT HIP AND PELVIS 2 VIEWS  HISTORY: Posterior medical tip pain  COMPARISON: 08/18/2017  FINDINGS: 1. Dislocation of the left hip prosthesis. 2. Can't rule out fracture of the lesser trochanter. 3. No pelvic fracture noted.  This report was finalized on 9/15/2017 7:51 AM by Dr. Víctor Garcia MD.      Xr Hip With Or Without Pelvis 2 - 3 View Left    Result Date:  8/17/2017  Narrative: PELVIS AND LEFT HIP  HISTORY fall, pain.  FINDINGS: An AP view of the pelvis as well as AP and crosstable lateral views of the left hip demonstrates a subcapital fracture on the left. There is no evidence of dislocation. There is moderate displacement. The above information was called to and discussed with Dr. Patiño.  This report was finalized on 8/17/2017 6:57 AM by Dr. Mika Delgadillo MD.        Assessment: left hip bipolar prosthesis dislocation, posterior     Patient Active Problem List   Diagnosis   • Subcapital fracture of hip   • Atrial fibrillation   • HX: long term anticoagulant use   • Hypothyroidism   • HTN (hypertension)   • Anemia   • Dislocation of left hip   • History of left hip replacement   • NSTEMI (non-ST elevated myocardial infarction)   • Coumadin toxicity       Plan:    The patient is indicated for a conversion of her partial to a  total hip arthroplasty.  The likely,  Risks and benefits of the procedure including but not limited to infection, DVT, pulmonary embolism,  leg length discrepancy, recurrent dislocation, possibility of injury to nerves or vessels, possibility of periprosthetic fractures have been discussed in detail.  Despite the risks involved, The patient would like to proceed.  The patient is being scheduled for a conversion of her previous bipolar to a total hip arthroplasty by posterior approach at [facility] tentatively for September 19, 2017 .   In the interim.  The patient will be managed with closed reduction secondary to her elevated INR, recent history of myocardial infarction. This will be done on September 15, 2017.  Patient will be made NPO.   Obtain informed consent.   The patients Coumadin has been on hold.   The patient's admitting service has seen the patient and the patient is cleared to the operating room.  The patient has been seen by  cardiology service and cleared to the operating room.     Date: 9/15/2017    Iam Greene,  MD    CC: Guzman Watkins MD; MD Gregory Cruz MD

## 2017-09-16 NOTE — PLAN OF CARE
Problem: Patient Care Overview (Adult)  Goal: Plan of Care Review  Outcome: Ongoing (interventions implemented as appropriate)    09/16/17 1325   Coping/Psychosocial Response Interventions   Plan Of Care Reviewed With patient;family   Patient Care Overview   Progress no change   Outcome Evaluation   Outcome Summary/Follow up Plan Pt has a regular diet today but does not want to eat pt family at the bedside today pt labs are all high troponin was 22.00 and INR 4.5 MD is aware will cont to monitor         Problem: Confusion, Chronic (Adult)  Goal: Identify Related Risk Factors and Signs and Symptoms  Outcome: Ongoing (interventions implemented as appropriate)  Goal: Cognitive/Functional Impairments Minimized  Outcome: Ongoing (interventions implemented as appropriate)  Goal: Free from Harm/Injuries  Outcome: Ongoing (interventions implemented as appropriate)    Problem: Infection, Risk/Actual (Adult)  Goal: Identify Related Risk Factors and Signs and Symptoms  Outcome: Ongoing (interventions implemented as appropriate)  Goal: Infection Prevention/Resolution  Outcome: Ongoing (interventions implemented as appropriate)    Problem: Pain, Acute (Adult)  Goal: Identify Related Risk Factors and Signs and Symptoms  Outcome: Ongoing (interventions implemented as appropriate)  Goal: Acceptable Pain Control/Comfort Level  Outcome: Ongoing (interventions implemented as appropriate)    Problem: Perioperative Period (Adult)  Goal: Signs and Symptoms of Listed Potential Problems Will be Absent or Manageable (Perioperative Period)  Outcome: Ongoing (interventions implemented as appropriate)

## 2017-09-16 NOTE — THERAPY EVALUATION
"Acute Care - Speech Language Pathology   Swallow Initial Evaluation [facility]     Patient Name: Rosetta Hemphill  : 1930  MRN: [id number]  Today's Date: 2017               SPEECH-LANGUAGE PATHOLOGY EVALUATION - SWALLOW  Subjective: The patient was seen on this date for a Clinical Swallow evaluation.  Patient was poorly cooperative.    The patient's history is significant for closed hip reduction, recent CVA..   Objective: Textures given included ice chip and thin liquids. Pt refused to cooperate and take any other po trials of any consistency.   Assessment: No overt clinical s/s aspiration noted on ice chip or small sips of water from straw, although trials were limited. Family deny any episodes of coughing/choking, but do report limited po intake, as does nurse. Family did state that pt will hold food in mouth at times, and then suddenly release/spit out in a burst. Cannot r/o dysphagia due to limited assessment. Family also did state that they feel that the pt simply \"wants to go\" and has not been eating very well for months.   SLP Findings:   R/O dysphagia  Recommendations: Diet Textures:  Cannot recommend safest diet due to pts refusal to cooperate with evaluation. Given pts generalized weakness, and prior hx- CVA, would at least recommend downgrade to mech soft- if s/s aspiration noted at meals, would recommend further downgrade to puree, or npo until additional swallowing reassessment or instrumental exam can be completed. Medications should be taken either whole w/thin liquid or crushed or whole in puree.    Recommended Strategies: Upright for PO and small bites and sips. Oral care before breakfast, after all meals and PRN.  Other Recommended Evaluations: Re-evaluation at bedside     Rationale- Safe swallow- least restrictive diet   Admit Date: 2017    Visit Dx:     ICD-10-CM ICD-9-CM   1. Instability of prosthetic hip, initial encounter T84.029A 996.42    Z96.60 V43.64     Patient Active " Problem List   Diagnosis   • Subcapital fracture of hip   • Atrial fibrillation   • HX: long term anticoagulant use   • Hypothyroidism   • HTN (hypertension)   • Anemia   • Dislocation of left hip   • History of left hip replacement   • NSTEMI (non-ST elevated myocardial infarction)   • Coumadin toxicity   • Instability of prosthetic hip     Past Medical History:   Diagnosis Date   • Arthritis    • Asthma    • Atrial fibrillation    • Disease of thyroid gland    • GERD (gastroesophageal reflux disease)    • Hyperlipidemia    • Hypertension    • Stroke      Past Surgical History:   Procedure Laterality Date   • JOINT REPLACEMENT     • [place] PARTIAL HIP REPLACEMENT Left 8/18/2017    Procedure: PARTIAL HIP REPLACEMENT;  Surgeon: Iam Greene MD;  Location: [facility] OR;  Service: Orthopedics          SWALLOW EVALUATION (last 72 hours)      Swallow Evaluation       09/16/17 1400                Rehab Evaluation    Document Type evaluation  -SL        Evaluation Not Performed, Comment pt refused all po trials except for a few sips of water and an ice chip  -SL        Patient Effort, Rehab Treatment poor  -SL        Symptoms Noted During/After Treatment fatigue  -SL        General Information    Patient Profile Review yes  -SL        Subjective Patient Observations opened eyes briefly- otherwise kept closed  -SL        Pertinent History Of Current Problem s/p left hip closed reduction, prior cva  -SL        Current Diet Limitations regular solid;thin liquids  -SL        Precautions/Limitations, Vision WFL  -SL        Precautions/Limitations, Hearing WFL  -SL        Prior Level of Function- Communication functional in all spheres  -SL        Prior Level of Function- Swallowing dietary restrictions (e.g. consistent carb, low salt, etc)  -SL        Plans/Goals Discussed With patient and family  -SL        Barriers to Rehab uncooperative;previous functional deficit  -SL        Clinical Impression    Patient's Goals  For Discharge patient did not state  -        Family Goals For Discharge family did not state  -        SLP Swallowing Diagnosis --   r/o dysphagia  -        Rehab Potential/Prognosis, Swallowing fair, will monitor progress closely  -        Functional Level At Time Of Eval imapired  -        Criteria for Skilled Therapeutic Interventions Met other (see comments)   reassess at bedside- pt refused most po trials this date  -        Therapy Frequency PRN  -        Predicted Duration Therapy Interv (days) until discharge  -        Expected Duration Therapy Session (min) other (see comments)   reassess at bedside before determining  -        SLP Diet Recommendation --   cannot safely recommend diet d/t pt refusal to compelte eval  -        Recommended Diagnostics reassess via clinical swallow (non-instrumental exam)  -        Recommended Feeding/Eating Techniques small sips/bites;maintain upright posture during/after eating for 30 mins;alternate between small bites and sips of food/liquid  -        SLP Rec. for Method of Medication Administration meds crushed in pudding/applesauce  -        Monitor For Signs Of Aspiration cough;elevated WBC count;gurgly voice;throat clearing;fever;upper respiratory infection;pneumonia;right lower lobe infiltrates  -        Anticipated Discharge Disposition home with 24/7 care  -        Cognitive Assessment/Intervention    Current Cognitive/Communication Assessment functional  -        Orientation Status oriented x 4  -        Follows Commands/Answers Questions able to follow single-step instructions  -        Oral Motor Structure and Function    Oral Motor Anatomy and Physiology patient demonstrates anatomy that is WNL  -        Dentition Assessment has dentures but unable to use them due to poor fit/pain  -        Secretion Management WNL/WFL  -        Mucosal Quality moist, healthy  -        Volitional Swallow no difficulties initiating  volitional swallow  -SL        Volitional Cough weak volitional cough  -SL        General Feeding/Swallowing Observations    Current Feeding Method oral feeding methods  -SL        Clinical Swallow Exam    Summary of Clinical Exam pt displayed no overt clinical s/s aspiraiton on single ice chip or several small straw sips of water from cup- she refused all otehr po attempts; family report llimited intake- nurse report pt did not eat lunch; family did report that pt often holds food in mouth, and occasionally spits out in burst; cannot determine safety of po consistencies due to pt refusal to cooperate for evaluation  -SL        Swallow Recommendations    Unable to Perform/Complete Swallow Exam unable to complete swallow evaluation   pt refused most po trials except for thin liqs  -        Eating Assistance needs constant supervision during self eating activity  -SL        Oral Care oral care with toothbrush and dentifrice before breakfast and after meals and PRN  -SL        Modified Eating Strategies caregiver supervision required during eating  -        Other Recommendations repeat clincial exam;meds crushed and mixed  -        Recommended Diet --   cannot determine safest diet d/t refusal to eat-  -SL          User Key  (r) = Recorded By, (t) = Taken By, (c) = Cosigned By    Initials Name Effective Dates     Sofie Cain MS CCC-SLP 04/13/15 -         EDUCATION  The patient has been educated in the following areas:   Dysphagia (Swallowing Impairment).    SLP Recommendation and Plan  SLP Swallowing Diagnosis:  (r/o dysphagia)  SLP Diet Recommendation:  (cannot safely recommend diet d/t pt refusal to compelte eval)  Recommended Feeding/Eating Techniques: small sips/bites, maintain upright posture during/after eating for 30 mins, alternate between small bites and sips of food/liquid  SLP Rec. for Method of Medication Administration: meds crushed in pudding/applesauce  Monitor For Signs Of Aspiration: cough,  elevated WBC count, gurgly voice, throat clearing, fever, upper respiratory infection, pneumonia, right lower lobe infiltrates  Recommended Diagnostics: reassess via clinical swallow (non-instrumental exam)  Criteria for Skilled Therapeutic Interventions Met: other (see comments) (reassess at bedside- pt refused most po trials this date)  Anticipated Discharge Disposition: home with 24/7 care  Rehab Potential/Prognosis, Swallowing: fair, will monitor progress closely  Therapy Frequency: PRN                        IP SLP Goals       09/16/17 1502          Safely Consume Diet    Safely Consume Diet- SLP, Date Established 09/16/17  -SL      Safely Consume Diet- SLP, Time to Achieve by discharge  -        User Key  (r) = Recorded By, (t) = Taken By, (c) = Cosigned By    Initials Name Provider Type    HERB Cain MS CCC-SLP Speech and Language Pathologist             SLP Outcome Measures (last 72 hours)      SLP Outcome Measures       09/16/17 1500          SLP Outcome Measures    Outcome Measure Used? Adult NOMS  -      FCM Scores    Swallowing FCM Score 3  -        User Key  (r) = Recorded By, (t) = Taken By, (c) = Cosigned By    Initials Name Effective Dates    HERB Cain MS CCC-SLP 04/13/15 -            Time Calculation:         Time Calculation- SLP       09/16/17 1503          Time Calculation- SLP    SLP Received On 09/16/17  -        User Key  (r) = Recorded By, (t) = Taken By, (c) = Cosigned By    Initials Name Provider Type    HERB Cain MS CCC-SLP Speech and Language Pathologist          Therapy Charges for Today     Code Description Service Date Service Provider Modifiers Qty    [id number] HC ST EVAL ORAL PHARYNG SWALLOW 3 9/16/2017 Sofie Cain MS CCC-SLP GN 1               Sofie Cain MS CCC-ROSSY  9/16/2017

## 2017-09-16 NOTE — PLAN OF CARE
Problem: Inpatient SLP  Goal: Dysphagia- Patient will safely consume diet as per recommendation with no signs/symptoms of aspiration    09/16/17 1502   Safely Consume Diet   Safely Consume Diet- SLP, Date Established 09/16/17   Safely Consume Diet- SLP, Time to Achieve by discharge

## 2017-09-16 NOTE — PLAN OF CARE
Problem: Patient Care Overview (Adult)  Goal: Plan of Care Review  Outcome: Ongoing (interventions implemented as appropriate)    09/16/17 0624   Coping/Psychosocial Response Interventions   Plan Of Care Reviewed With patient;family   Patient Care Overview   Progress no change   Outcome Evaluation   Outcome Summary/Follow up Plan Pt. VSS throughout post-op period. She has complained about pain this morning and was given iv morphine as ordered. Family members are supportive of her recovery and plan to visit her today. She is still NPO, mouth care given. Will continue to monitor for s/s of infection.         Problem: Confusion, Chronic (Adult)  Goal: Identify Related Risk Factors and Signs and Symptoms  Outcome: Ongoing (interventions implemented as appropriate)  Goal: Cognitive/Functional Impairments Minimized  Outcome: Ongoing (interventions implemented as appropriate)  Goal: Free from Harm/Injuries  Outcome: Ongoing (interventions implemented as appropriate)    Problem: Infection, Risk/Actual (Adult)  Goal: Identify Related Risk Factors and Signs and Symptoms  Outcome: Ongoing (interventions implemented as appropriate)  Goal: Infection Prevention/Resolution  Outcome: Ongoing (interventions implemented as appropriate)    Problem: Pain, Acute (Adult)  Goal: Identify Related Risk Factors and Signs and Symptoms  Outcome: Ongoing (interventions implemented as appropriate)  Goal: Acceptable Pain Control/Comfort Level  Outcome: Ongoing (interventions implemented as appropriate)    Problem: Perioperative Period (Adult)  Goal: Signs and Symptoms of Listed Potential Problems Will be Absent or Manageable (Perioperative Period)  Outcome: Ongoing (interventions implemented as appropriate)

## 2017-09-16 NOTE — PROGRESS NOTES
[facility]  Cardiology Progress Note    Patient Identification:  Name: Rosetta Hemphill  Age: 87 y.o.  Sex: female  :  1930  MRN: [id number]                 Follow Up / Chief Complaint: Elevated troponin    Interval History:  87-year-old female with the recent hip fractures with a history of the hypertension and strokes and atrial fibrillation has been admitted because of the elevated troponin     Subjective:  Unable to communicate as the patient has severe dementia    Objective:    Past Medical History:  Past Medical History:   Diagnosis Date   • Arthritis    • Asthma    • Atrial fibrillation    • Disease of thyroid gland    • GERD (gastroesophageal reflux disease)    • Hyperlipidemia    • Hypertension    • Stroke      Past Surgical History:  Past Surgical History:   Procedure Laterality Date   • JOINT REPLACEMENT     • [place] PARTIAL HIP REPLACEMENT Left 2017    Procedure: PARTIAL HIP REPLACEMENT;  Surgeon: Iam Greene MD;  Location: [facility];  Service: Orthopedics        Social History:   Social History   Substance Use Topics   • Smoking status: Never Smoker   • Smokeless tobacco: Not on file   • Alcohol use No      Family History:  History reviewed. No pertinent family history.       Allergies:  No Known Allergies  Scheduled Meds:    amLODIPine 5 mg Daily   aspirin 81 mg Daily   atorvastatin 80 mg Daily   carvedilol 12.5 mg BID With Meals   folic acid 1 mg Daily   gabapentin 100 mg Daily   levothyroxine 125 mcg Daily   lidocaine 1 patch BID   montelukast 10 mg Nightly   pantoprazole 40 mg Daily   sertraline 50 mg Daily   vitamin B-12 1,000 mcg Daily           INTAKE AND OUTPUT:    Intake/Output Summary (Last 24 hours) at 17 1323  Last data filed at 09/15/17 1730   Gross per 24 hour   Intake              400 ml   Output                0 ml   Net              400 ml       Review of Systems: Unable to communicate because of  dementia  GI:  Cardiac:  Pulmonary:    Constitutional:  Temp:  [97.3 °F (36.3 °C)-98 °F (36.7 °C)] 97.3 °F (36.3 °C)  Heart Rate:  [69-86] 69  Resp:  [12-16] 16  BP: (105-143)/(57-89) 143/83    Physical Exam by Radu Hamilton MD  General:  Appears in no acute distress  Eyes: PERTL,  HEENT:  No JVD. Thyroid not visibly enlarged. No mucosal pallor or cyanosis  Respiratory: Respirations regular and unlabored at rest. BBS with good air entry in all fields. No crackles, rubs or wheezes auscultated  Cardiovascular: S1S2 Regular rate and rhythm. No murmur, rub or gallop. No carotid bruits. DP/PT pulses     . No pretibial pitting edema  Gastrointestinal: Abdomen soft, flat, non tender. Bowel sounds present. No hepatosplenomegaly. No ascites  Musculoskeletal: ART x4. No abnormal movements  Extremities: No digital clubbing or cyanosis  Skin: Color pink. Skin warm and dry to touch. No rashes    Neuro: AAO x3 CN II-XII grossly intact          Cardiographics  Telemetry:     ECG:     Echocardiogram:     Lab Review     Results from last 7 days  Lab Units 09/16/17  0907 09/15/17  0635   TROPONIN T ng/mL 2.200* 3.050*           Results from last 7 days  Lab Units 09/16/17  0907   SODIUM mmol/L 131*   POTASSIUM mmol/L 4.6   BUN mg/dL 34*   CREATININE mg/dL 0.87   CALCIUM mg/dL 9.1     @[id number]@    Results from last 7 days  Lab Units 09/16/17  0907 09/15/17  0635   WBC 10*3/mm3 7.99 12.45*   HEMOGLOBIN g/dL 8.9* 10.2*   HEMATOCRIT % 27.9* 31.8*   PLATELETS 10*3/mm3 202 222       Results from last 7 days  Lab Units 09/16/17  0907 09/15/17  0817   INR  4.48* 4.40*         Assessment:  Patient Active Problem List   Diagnosis   • Subcapital fracture of hip   • Atrial fibrillation   • HX: long term anticoagulant use   • Hypothyroidism   • HTN (hypertension)   • Anemia   • Dislocation of left hip   • History of left hip replacement   • NSTEMI (non-ST elevated myocardial infarction)   • Coumadin toxicity   • Instability of  "prosthetic hip           Plan:  Continue the conservative management only    Not a candidate for invasive    Continue calcium blockers and aspirin  Labs/tests ordered for am      I reviewed the patient's new clinical results and treatment plan with Dr Hamilton.  I personally viewed and interpreted the patient's EKG/Telemetry data    )9/16/2017  Radu Hamilton MD      EMR Dragon/Transcription:   \"Dictated utilizing Dragon dictation\".     "

## 2017-09-16 NOTE — PROGRESS NOTES
[facility]     LOS: 2 days     Subjective:  No cp, no soa, hip is doing ok, morphine x1 this am    Npo, changing to regular diet      No n/v/d      Objective:    Vital Signs:  Temp:  [97.3 °F (36.3 °C)-98 °F (36.7 °C)] 97.3 °F (36.3 °C)  Heart Rate:  [69-86] 69  Resp:  [12-16] 16  BP: (105-143)/(57-89) 143/83    General Appearance: no acute distress, appears comfortable  Heart: regular rate and rhythm  Lungs: clear to auscultation bilaterally, respirations unlabored, good air entry  Abdomen: soft, non-tender, no guarding, no rebound, non-distended  Extremities: no edema, no cyanosis  Neurology: speech normal, CN grossly normal, confused, says she is in [place].   Psychiatric: normal mood and affect    Results Review:    Glucose   Date Value Ref Range Status   09/15/2017 112 (H) 65 - 99 mg/dL Final       Results from last 7 days  Lab Units 09/15/17  0635   WBC 10*3/mm3 12.45*   HEMOGLOBIN g/dL 10.2*   HEMATOCRIT % 31.8*   PLATELETS 10*3/mm3 222       Results from last 7 days  Lab Units 09/15/17  0635   SODIUM mmol/L 130*   POTASSIUM mmol/L 4.6   CHLORIDE mmol/L 94*   CO2 mmol/L 20.3*   BUN mg/dL 29*   CREATININE mg/dL 0.83   CALCIUM mg/dL 9.6   GLUCOSE mg/dL 112*       Results from last 7 days  Lab Units 09/15/17  0817   INR  4.40*             Results from last 7 days  Lab Units 09/15/17  0635   TROPONIN T ng/mL 3.050*                             I have reviewed daily medications and changes in CPOE    Scheduled meds    amLODIPine 5 mg Oral Daily   aspirin 81 mg Oral Daily   atorvastatin 80 mg Oral Daily   carvedilol 12.5 mg Oral BID With Meals   folic acid 1 mg Oral Daily   gabapentin 100 mg Oral Daily   levothyroxine 125 mcg Oral Daily   lidocaine 1 patch Transdermal BID   montelukast 10 mg Oral Nightly   pantoprazole 40 mg Oral Daily   sertraline 50 mg Oral Daily   vitamin B-12 1,000 mcg Oral Daily         lactated ringers 9 mL/hr    sodium chloride 50 mL/hr Last Rate: 50 mL/hr  (09/15/17 2034)     PRN meds  •  acetaminophen  •  fluticasone  •  Morphine **AND** naloxone  •  ondansetron  •  sodium chloride      Principal Problem:    Instability of prosthetic hip  Active Problems:    Atrial fibrillation    HX: long term anticoagulant use    Hypothyroidism    HTN (hypertension)    Anemia    Dislocation of left hip    History of left hip replacement    NSTEMI (non-ST elevated myocardial infarction)    Coumadin toxicity        Assessment/Plan:      Instability of prosthetic hip- s/p reduction yesterday, PT as per the orthopedic team      Atrial fibrillation      HX: long term anticoagulant use      Hypothyroidism- on replacement, tsh 10 on 8/17. Will repeat in the am.      HTN (hypertension)      Anemia      Dislocation of left hip      History of left hip replacement      NSTEMI (non-ST elevated myocardial infarction)- repeat the troponin this am. Cardiology is following. Aspirin, lipitor, and INR is high so hold on lovenox      Coumadin toxicity- inr is high and will recheck this am      Mika Ledbetter MD  09/16/17  8:39 AM

## 2017-09-17 NOTE — PROGRESS NOTES
[facility]     LOS: 3 days     Subjective:  No cp, no soa, hip is doing ok, morphine x1 this am    Not wanting to eat, esure    Grand-daughters have visited and son has called    No n/v/d      Objective:    Vital Signs:  Temp:  [97.4 °F (36.3 °C)-98.8 °F (37.1 °C)] 97.6 °F (36.4 °C)  Heart Rate:  [69-75] 70  Resp:  [16] 16  BP: (112-125)/() 118/105    General Appearance: no acute distress, appears comfortable  Heart: regular rate and rhythm  Lungs: clear to auscultation bilaterally, respirations unlabored, good air entry  Abdomen: soft, non-tender, no guarding, no rebound, non-distended  Extremities: no edema, no cyanosis  Neurology: speech normal, CN grossly normal, confused, says she is in [place].   Psychiatric: normal mood and affect    Results Review:    Glucose   Date Value Ref Range Status   09/17/2017 111 (H) 65 - 99 mg/dL Final   09/16/2017 107 (H) 65 - 99 mg/dL Final   09/15/2017 112 (H) 65 - 99 mg/dL Final       Results from last 7 days  Lab Units 09/17/17  0534   WBC 10*3/mm3 6.11   HEMOGLOBIN g/dL 8.2*   HEMATOCRIT % 26.1*   PLATELETS 10*3/mm3 183       Results from last 7 days  Lab Units 09/17/17  0534   SODIUM mmol/L 133*   POTASSIUM mmol/L 4.2   CHLORIDE mmol/L 99   CO2 mmol/L 19.8*   BUN mg/dL 40*   CREATININE mg/dL 0.85   CALCIUM mg/dL 8.8   BILIRUBIN mg/dL 0.8   ALK PHOS U/L 97   ALT (SGPT) U/L 15   AST (SGOT) U/L 70*   GLUCOSE mg/dL 111*       Results from last 7 days  Lab Units 09/17/17  0534   INR  5.63*             Results from last 7 days  Lab Units 09/16/17  0907 09/15/17  0635   TROPONIN T ng/mL 2.200* 3.050*                             I have reviewed daily medications and changes in CPOE    Scheduled meds    amLODIPine 5 mg Oral Daily   aspirin 81 mg Oral Daily   atorvastatin 80 mg Oral Daily   carvedilol 12.5 mg Oral BID With Meals   folic acid 1 mg Oral Daily   gabapentin 100 mg Oral Daily   levothyroxine 125 mcg Oral Daily   lidocaine 1 patch  Transdermal BID   montelukast 10 mg Oral Nightly   pantoprazole 40 mg Oral Daily   sertraline 50 mg Oral Daily   vitamin B-12 1,000 mcg Oral Daily   vitamin K1 2.5 mg Oral Once         lactated ringers 9 mL/hr    sodium chloride 50 mL/hr Last Rate: 50 mL/hr (09/17/17 0534)     PRN meds  •  acetaminophen  •  fluticasone  •  Morphine **AND** naloxone  •  ondansetron  •  sodium chloride      Principal Problem:    Instability of prosthetic hip  Active Problems:    Atrial fibrillation    HX: long term anticoagulant use    Hypothyroidism    HTN (hypertension)    Anemia    Dislocation of left hip    History of left hip replacement    NSTEMI (non-ST elevated myocardial infarction)    Coumadin toxicity        Assessment/Plan:      Instability of prosthetic hip- s/p reduction yesterday, PT as per the orthopedic team, hip xray yesterday.      Atrial fibrillation- paced on the monitor      HX: long term anticoagulant use      Hypothyroidism- on replacement, tsh 10 on 8/17 and 5.7 on 9/16. Given the MI would keep the dose the same for now.      HTN (hypertension)      Anemia      Dislocation of left hip      History of left hip replacement      NSTEMI (non-ST elevated myocardial infarction)-troponin was down 9/16. Cardiology is following. Aspirin, lipitor, coreg, norvasc and INR is high     HTN- as above      Coumadin toxicity- inr continues to climb, vit k    Consider transfer to orthopedic floor if cardiology feels she is stable to come off monitor.    Mika Ledbetter MD  09/17/17  8:31 AM

## 2017-09-17 NOTE — PROGRESS NOTES
[facility]  Cardiology Progress Note    Patient Identification:  Name: Rosetta Hemphill  Age: 87 y.o.  Sex: female  :  1930  MRN: [id number]                 Follow Up / Chief Complaint: Elevated troponin    Interval History:  87-year-old female with the recent hip fractures with a history of the hypertension and strokes and atrial fibrillation has been admitted because of the elevated troponin     Subjective:  Unable to communicate as the patient has severe dementia    Objective:    Past Medical History:  Past Medical History:   Diagnosis Date   • Arthritis    • Asthma    • Atrial fibrillation    • Disease of thyroid gland    • GERD (gastroesophageal reflux disease)    • Hyperlipidemia    • Hypertension    • Stroke      Past Surgical History:  Past Surgical History:   Procedure Laterality Date   • JOINT REPLACEMENT     • CT PARTIAL HIP REPLACEMENT Left 2017    Procedure: PARTIAL HIP REPLACEMENT;  Surgeon: Iam Greene MD;  Location: [facility];  Service: Orthopedics        Social History:   Social History   Substance Use Topics   • Smoking status: Never Smoker   • Smokeless tobacco: Not on file   • Alcohol use No      Family History:  History reviewed. No pertinent family history.       Allergies:  No Known Allergies  Scheduled Meds:    amLODIPine 5 mg Daily   aspirin 81 mg Daily   atorvastatin 80 mg Daily   carvedilol 12.5 mg BID With Meals   folic acid 1 mg Daily   gabapentin 100 mg Daily   levothyroxine 125 mcg Daily   lidocaine 1 patch BID   montelukast 10 mg Nightly   pantoprazole 40 mg Daily   sertraline 50 mg Daily   vitamin B-12 1,000 mcg Daily   vitamin K1 2.5 mg Once           INTAKE AND OUTPUT:  No intake or output data in the 24 hours ending 17 0916    Review of Systems: Unable to communicate because of dementia  GI:  Cardiac:  Pulmonary:    Constitutional:  Temp:  [97.4 °F (36.3 °C)-98.8 °F (37.1 °C)] 97.6 °F (36.4 °C)  Heart Rate:  [69-75] 70  Resp:  [16]  16  BP: (112-125)/() 118/105    Physical Exam by Radu Hamilton MD  General:  Appears in no acute distress  Eyes: PERTL,  HEENT:  No JVD. Thyroid not visibly enlarged. No mucosal pallor or cyanosis  Respiratory: Respirations regular and unlabored at rest. BBS with good air entry in all fields. No crackles, rubs or wheezes auscultated  Cardiovascular: S1S2 Regular rate and rhythm. No murmur, rub or gallop. No carotid bruits. DP/PT pulses     . No pretibial pitting edema  Gastrointestinal: Abdomen soft, flat, non tender. Bowel sounds present. No hepatosplenomegaly. No ascites  Musculoskeletal: ART x4. No abnormal movements  Extremities: No digital clubbing or cyanosis  Skin: Color pink. Skin warm and dry to touch. No rashes    Neuro: AAO x3 CN II-XII grossly intact          Cardiographics  Telemetry:     ECG:     Echocardiogram:     Lab Review     Results from last 7 days  Lab Units 09/16/17  0907 09/15/17  0635   TROPONIN T ng/mL 2.200* 3.050*           Results from last 7 days  Lab Units 09/17/17  0534   SODIUM mmol/L 133*   POTASSIUM mmol/L 4.2   BUN mg/dL 40*   CREATININE mg/dL 0.85   CALCIUM mg/dL 8.8     @[id number]@    Results from last 7 days  Lab Units 09/17/17  0534 09/16/17  0907 09/15/17  0635   WBC 10*3/mm3 6.11 7.99 12.45*   HEMOGLOBIN g/dL 8.2* 8.9* 10.2*   HEMATOCRIT % 26.1* 27.9* 31.8*   PLATELETS 10*3/mm3 183 202 222       Results from last 7 days  Lab Units 09/17/17  0534 09/16/17  0907 09/15/17  0817   INR  5.63* 4.48* 4.40*         Assessment:  Patient Active Problem List   Diagnosis   • Subcapital fracture of hip   • Atrial fibrillation   • HX: long term anticoagulant use   • Hypothyroidism   • HTN (hypertension)   • Anemia   • Dislocation of left hip   • History of left hip replacement   • NSTEMI (non-ST elevated myocardial infarction)   • Coumadin toxicity   • Instability of prosthetic hip           Plan:  Continue the conservative management only, May transfer to the orthopedic  "floor    Not a candidate for invasive    Continue calcium blockers and aspirin  Labs/tests ordered for am      I reviewed the patient's new clinical results and treatment plan with Dr Hamilton.  I personally viewed and interpreted the patient's EKG/Telemetry data    )9/17/2017  Radu Hamilton MD      EMR Dragon/Transcription:   \"Dictated utilizing Dragon dictation\".     "

## 2017-09-17 NOTE — PLAN OF CARE
Problem: Patient Care Overview (Adult)  Goal: Plan of Care Review  Outcome: Ongoing (interventions implemented as appropriate)    09/17/17 1633   Coping/Psychosocial Response Interventions   Plan Of Care Reviewed With patient   Patient Care Overview   Progress no change   Outcome Evaluation   Outcome Summary/Follow up Plan pt has c/o of pain PT INR 5.84 and PT 50.8 vit k given today pt has been repositioned pt refuses to work with PT will cont to monitor.         Problem: Confusion, Chronic (Adult)  Goal: Identify Related Risk Factors and Signs and Symptoms  Outcome: Ongoing (interventions implemented as appropriate)  Goal: Cognitive/Functional Impairments Minimized  Outcome: Ongoing (interventions implemented as appropriate)  Goal: Free from Harm/Injuries  Outcome: Ongoing (interventions implemented as appropriate)    Problem: Infection, Risk/Actual (Adult)  Goal: Identify Related Risk Factors and Signs and Symptoms  Outcome: Ongoing (interventions implemented as appropriate)  Goal: Infection Prevention/Resolution  Outcome: Ongoing (interventions implemented as appropriate)    Problem: Pain, Acute (Adult)  Goal: Identify Related Risk Factors and Signs and Symptoms  Outcome: Ongoing (interventions implemented as appropriate)    Problem: Perioperative Period (Adult)  Goal: Signs and Symptoms of Listed Potential Problems Will be Absent or Manageable (Perioperative Period)  Outcome: Ongoing (interventions implemented as appropriate)

## 2017-09-17 NOTE — PLAN OF CARE
Problem: Patient Care Overview (Adult)  Goal: Plan of Care Review  Outcome: Ongoing (interventions implemented as appropriate)    09/17/17 0455   Coping/Psychosocial Response Interventions   Plan Of Care Reviewed With patient   Patient Care Overview   Progress no change   Outcome Evaluation   Outcome Summary/Follow up Plan Poor oral intake, skin care and repositioning performed, incontinent, no distress.        Goal: Adult Individualization and Mutuality  Outcome: Ongoing (interventions implemented as appropriate)  Goal: Discharge Needs Assessment  Outcome: Ongoing (interventions implemented as appropriate)    Problem: Confusion, Chronic (Adult)  Goal: Identify Related Risk Factors and Signs and Symptoms  Outcome: Ongoing (interventions implemented as appropriate)  Goal: Cognitive/Functional Impairments Minimized  Outcome: Ongoing (interventions implemented as appropriate)  Goal: Free from Harm/Injuries  Outcome: Ongoing (interventions implemented as appropriate)    Problem: Infection, Risk/Actual (Adult)  Goal: Identify Related Risk Factors and Signs and Symptoms  Outcome: Ongoing (interventions implemented as appropriate)  Goal: Infection Prevention/Resolution  Outcome: Ongoing (interventions implemented as appropriate)    Problem: Pain, Acute (Adult)  Goal: Acceptable Pain Control/Comfort Level  Outcome: Ongoing (interventions implemented as appropriate)    Problem: Perioperative Period (Adult)  Goal: Signs and Symptoms of Listed Potential Problems Will be Absent or Manageable (Perioperative Period)  Outcome: Ongoing (interventions implemented as appropriate)

## 2017-09-18 NOTE — PROGRESS NOTES
Discharge Planning Assessment  [facility]     Patient Name: Rosetta Hemphill  MRN: [id number]  Today's Date: 9/18/2017    Admit Date: 9/14/2017          Discharge Needs Assessment     None            Discharge Plan       09/18/17 1717    Case Management/Social Work Plan    Additional Comments The patient transferred to [facility] from [address] [place] [address] on 9/18/17 @15:14. The patient is palliative. No [facility] order at this time. CCP will follow for any needs that may arise. LEDY Low RN, CCP.         Discharge Placement     No information found                Demographic Summary     None            Functional Status     None            Psychosocial     None            Abuse/Neglect     None            Legal     None            Substance Abuse     None            Patient Forms     None          Siria Low, RN

## 2017-09-18 NOTE — SIGNIFICANT NOTE
09/18/17 1003   Rehab Treatment   Discipline physical therapist   Rehab Evaluation   Evaluation Not Performed other (see comments)  (Noted hip instability and dislocation; plans for revision tomorrow. Will hold and assess mobility post-operatively. )   Recommendation   PT - Next Appointment 09/19/17

## 2017-09-18 NOTE — PROGRESS NOTES
Continued Stay Note  [facility]     Patient Name: Rosetta Hemphill  MRN: [id number]  Today's Date: 9/18/2017    Admit Date: 9/14/2017          Discharge Plan       09/18/17 1354    Case Management/Social Work Plan    Plan Transfer to palliative care.    Patient/Family In Agreement With Plan yes    Additional Comments Spoke with son at bedside.  He states that his mother does not wish to have anything further done.  He is waiting to discuss with admitting MD.  CCP will continue to follow if needs SNF at [facility] in [place], [place].              Discharge Codes     None            Becky S. [place], RN

## 2017-09-18 NOTE — PLAN OF CARE
Problem: Patient Care Overview (Adult)  Goal: Plan of Care Review  Outcome: Ongoing (interventions implemented as appropriate)    09/18/17 5354   Coping/Psychosocial Response Interventions   Plan Of Care Reviewed With patient;son   Patient Care Overview   Progress no change   Outcome Evaluation   Outcome Summary/Follow up Plan [facility]. IVF continued, plan to d/c tomorrow. Medicated with morphine prior to tx. Pt denies pain. Pt refusing to answer most questions. Oriented to self. Family at bedside. F/c in place. Pt and family educated about palliative care, will continue to provide comfort care.       Goal: Adult Individualization and Mutuality  Outcome: Ongoing (interventions implemented as appropriate)  Goal: Discharge Needs Assessment  Outcome: Ongoing (interventions implemented as appropriate)    Problem: Dying Patient, Actively (Adult)  Goal: Identify Related Risk Factors and Signs and Symptoms  Outcome: Ongoing (interventions implemented as appropriate)  Goal: Comfort/Pain Control  Outcome: Ongoing (interventions implemented as appropriate)  Goal: Dying Process, Peace and Dignity  Outcome: Ongoing (interventions implemented as appropriate)    Problem: Fall Risk (Adult)  Goal: Identify Related Risk Factors and Signs and Symptoms  Outcome: Ongoing (interventions implemented as appropriate)  Goal: Absence of Falls  Outcome: Ongoing (interventions implemented as appropriate)    Problem: Pressure Ulcer Risk (Miguelito Scale) (Adult,Obstetrics,Pediatric)  Goal: Identify Related Risk Factors and Signs and Symptoms  Outcome: Ongoing (interventions implemented as appropriate)  Goal: Skin Integrity  Outcome: Ongoing (interventions implemented as appropriate)

## 2017-09-18 NOTE — THERAPY RE-EVALUATION
Acute Care - Speech Language Pathology   Swallow Re-Evaluation [facility]     Patient Name: Rosetta Hemphill  : 1930  MRN: [id number]  Today's Date: 2017               Admit Date: 2017    SPEECH-LANGUAGE PATHOLOGY EVALUATION - SWALLOW  Subjective: The patient was seen on this date for a Clinical Swallow evaluation.  Patient was somnolent and poorly arousable. Pt agreeable to limited trials.  Pt's family verbalized pt wishes for comfort care. Minimal PO intake.   Objective: Textures given included thin liquid, pt refused further trials.  Assessment: No overt s/s aspiration with thin via cup and straw.  SLP Findings:  Patient presents with risk of aspiration with PO secondary to alertness, r/o oropharyngeal dysphagia.   Recommendations: Diet Textures: thin liquid, ? puree consistency food. Pt currently on regular with thins. Per family pt took 2 bites of macaroni and cheese, expectorating the noodle.  Medications should be taken crushed with puree.  Recommended Strategies: Upright for PO. Oral care before breakfast, after all meals and PRN.  Other Recommended Evaluations:     Dysphagia therapy is not recommended. Rationale: Not warranted, per discussion with RN @ 1400 pt to transfer to palliative care. Please re-consult as warranted.    Visit Dx:     ICD-10-CM ICD-9-CM   1. Instability of prosthetic hip, initial encounter T84.029A 996.42    Z96.60 V43.64     Patient Active Problem List   Diagnosis   • Subcapital fracture of hip   • Atrial fibrillation   • HX: long term anticoagulant use   • Hypothyroidism   • HTN (hypertension)   • Anemia   • Dislocation of left hip   • History of left hip replacement   • NSTEMI (non-ST elevated myocardial infarction)   • Coumadin toxicity   • Instability of prosthetic hip     Past Medical History:   Diagnosis Date   • Arthritis    • Asthma    • Atrial fibrillation    • Disease of thyroid gland    • GERD (gastroesophageal reflux disease)    • Hyperlipidemia    •  Hypertension    • Stroke      Past Surgical History:   Procedure Laterality Date   • JOINT REPLACEMENT     • ND PARTIAL HIP REPLACEMENT Left 8/18/2017    Procedure: PARTIAL HIP REPLACEMENT;  Surgeon: Iam Greene MD;  Location: [facility] OR;  Service: Orthopedics          SWALLOW EVALUATION (last 72 hours)      Swallow Evaluation       09/18/17 1130 09/16/17 1400             Rehab Evaluation    Document Type re-evaluation  -OC evaluation  -SL       Evaluation Not Performed, Comment Pt only agree able to sips of water  -OC pt refused all po trials except for a few sips of water and an ice chip  -SL       Patient Effort, Rehab Treatment poor  -OC poor  -SL       Symptoms Noted During/After Treatment  fatigue  -SL       General Information    Patient Profile Review yes  -OC yes  -SL       Subjective Patient Observations  opened eyes briefly- otherwise kept closed  -SL       Pertinent History Of Current Problem  s/p left hip closed reduction, prior cva  -SL       Current Diet Limitations thin liquids;regular solid;other (see comments)   per MD  -OC regular solid;thin liquids  -SL       Precautions/Limitations, Vision  WFL  -SL       Precautions/Limitations, Hearing  WFL  -SL       Prior Level of Function- Communication  functional in all spheres  -SL       Prior Level of Function- Swallowing no diet consistency restrictions  -OC dietary restrictions (e.g. consistent carb, low salt, etc)  -SL       Plans/Goals Discussed With patient and family;agreed upon  -OC patient and family  -SL       Barriers to Rehab --   limited participation  -OC uncooperative;previous functional deficit  -SL       Clinical Impression    Patient's Goals For Discharge patient did not state  -OC patient did not state  -SL       Family Goals For Discharge other (see comments)   To keep the pt comfortable  -OC family did not state  -SL       SLP Swallowing Diagnosis other (see comments)   r/o dyphagia  -OC --   r/o dysphagia  -SL        Rehab Potential/Prognosis, Swallowing fair, will monitor progress closely  -OC fair, will monitor progress closely  -SL       Functional Level At Time Of Eval  imapired  -       Criteria for Skilled Therapeutic Interventions Met not appropriate for intervention  -OC other (see comments)   reassess at bedside- pt refused most po trials this date  -       Therapy Frequency evaluation only  -OC PRN  -SL       Predicted Duration Therapy Interv (days)  until discharge  -SL       Expected Duration Therapy Session (min)  other (see comments)   reassess at bedside before determining  -SL       SLP Diet Recommendation other (see comments);thin liquids   puree and thin, ? diet per QOL  -OC --   cannot safely recommend diet d/t pt refusal to compelte eval  -SL       Recommended Diagnostics  reassess via clinical swallow (non-instrumental exam)  -SL       Recommended Feeding/Eating Techniques small sips/bites  -OC small sips/bites;maintain upright posture during/after eating for 30 mins;alternate between small bites and sips of food/liquid  -SL       SLP Rec. for Method of Medication Administration meds crushed in pudding/applesauce;meds via alternate route  -OC meds crushed in pudding/applesauce  -       Monitor For Signs Of Aspiration cough;elevated WBC count;gurgly voice;throat clearing  -OC cough;elevated WBC count;gurgly voice;throat clearing;fever;upper respiratory infection;pneumonia;right lower lobe infiltrates  -SL       Anticipated Discharge Disposition other (see comments)   unknown  -OC home with 24/7 care  -       Cognitive Assessment/Intervention    Current Cognitive/Communication Assessment impaired  -OC functional  -SL       Orientation Status oriented to;person  -OC oriented x 4  -SL       Follows Commands/Answers Questions  able to follow single-step instructions  -SL       Oral Motor Structure and Function    Oral Motor Anatomy and Physiology  patient demonstrates anatomy that is WNL  -SL        Dentition Assessment  has dentures but unable to use them due to poor fit/pain  -SL       Secretion Management  WNL/WFL  -SL       Mucosal Quality  moist, healthy  -SL       Volitional Swallow  no difficulties initiating volitional swallow  -SL       Volitional Cough  weak volitional cough  -SL       General Feeding/Swallowing Observations    Current Feeding Method  oral feeding methods  -SL       Clinical Swallow Exam    Summary of Clinical Exam  pt displayed no overt clinical s/s aspiraiton on single ice chip or several small straw sips of water from cup- she refused all otehr po attempts; family report llimited intake- nurse report pt did not eat lunch; family did report that pt often holds food in mouth, and occasionally spits out in burst; cannot determine safety of po consistencies due to pt refusal to cooperate for evaluation  -SL       Swallow Recommendations    Unable to Perform/Complete Swallow Exam  unable to complete swallow evaluation   pt refused most po trials except for thin liqs  -       Eating Assistance  needs constant supervision during self eating activity  -       Oral Care  oral care with toothbrush and dentifrice before breakfast and after meals and PRN  -SL       Modified Eating Strategies  caregiver supervision required during eating  -SL       Other Recommendations  repeat clincial exam;meds crushed and mixed  -       Recommended Diet  --   cannot determine safest diet d/t refusal to eat-  -         User Key  (r) = Recorded By, (t) = Taken By, (c) = Cosigned By    Initials Name Effective Dates    OC Xena Franco MA,[facility]-SLP 04/05/17 -     SL Sofie Cain, MS CCC-SLP 04/13/15 -         EDUCATION  The patient has been educated in the following areas:   Dysphagia (Swallowing Impairment).    SLP Recommendation and Plan  SLP Swallowing Diagnosis: other (see comments) (r/o dyphagia)  SLP Diet Recommendation: other (see comments), thin liquids (puree and thin, ? diet per QOL)  Recommended  Feeding/Eating Techniques: small sips/bites  SLP Rec. for Method of Medication Administration: meds crushed in pudding/applesauce, meds via alternate route  Monitor For Signs Of Aspiration: cough, elevated WBC count, gurgly voice, throat clearing     Criteria for Skilled Therapeutic Interventions Met: not appropriate for intervention  Anticipated Discharge Disposition: other (see comments) (unknown)  Rehab Potential/Prognosis, Swallowing: fair, will monitor progress closely  Therapy Frequency: evaluation only             Plan of Care Review  Plan Of Care Reviewed With: patient, family  Outcome Summary/Follow up Plan: Re-eval of swallow completed. Pt's family voiced desire for comfort measures. SLP notified RN/          IP SLP Goals       09/18/17 1130 09/16/17 1502       Safely Consume Diet    Safely Consume Diet- SLP, Date Established 09/18/17  -OC 09/16/17  -SL     Safely Consume Diet- SLP, Time to Achieve by discharge  -OC by discharge  -SL     Safely Consume Diet- SLP, Outcome goal ongoing  -OC        User Key  (r) = Recorded By, (t) = Taken By, (c) = Cosigned By    Initials Name Provider Type    OC Xena Franco MA,[facility]-SLP Speech and Language Pathologist     Sofie Cain, MS CCC-SLP Speech and Language Pathologist             SLP Outcome Measures (last 72 hours)      SLP Outcome Measures       09/18/17 1130 09/16/17 1500       SLP Outcome Measures    Outcome Measure Used? Adult NOMS  -OC Adult NOMS  -SL     FCM Scores    FCM Chosen Swallowing  -OC      Swallowing FCM Score 4  -OC 3  -SL       User Key  (r) = Recorded By, (t) = Taken By, (c) = Cosigned By    Initials Name Effective Dates    LAURITA Franco MA,[facility]-SLP 04/05/17 -      Sofie Cain, MS CCC-SLP 04/13/15 -            Time Calculation:         Time Calculation- SLP       09/18/17 1359          Time Calculation- SLP    SLP Start Time 1045  -OC      SLP Stop Time 1130  -OC      SLP Time Calculation (min) 45 min  -OC      SLP Received On 09/18/17  -OC         User Key  (r) = Recorded By, (t) = Taken By, (c) = Cosigned By    Initials Name Provider Type    OC Xena Franco MA,CCC-SLP Speech and Language Pathologist          Therapy Charges for Today     Code Description Service Date Service Provider Modifiers Qty    [id number]  ST TREATMENT SWALLOW 3 9/18/2017 Xena Franco MA,VITA-SLP GN 1               Xena Franco MA,SARAHI  9/18/2017

## 2017-09-18 NOTE — PROGRESS NOTES
Orthopedic Progress Note      Patient: Rosetta Hemphill  YOB: 1930     Date of Admission: 9/14/2017  7:51 PM Medical Record Number: [id number]     Attending Physician: Mika Ledbetter MD    Status Post:  IN CLOSED RX TRAUMATIC HIP DISLOCATN [85737] (LT HIP CLOSED REDUCTION) Post Operative Day Number: 3    Subjective : No new orthopaedic complaints     Pain Relief: some relief with present medication.     Systemic Complaints: No Complaints  Vitals:    09/17/17 1857 09/17/17 2013 09/18/17 0007 09/18/17 0728   BP: 125/68 115/61 123/76 133/65   BP Location: Left arm Left arm Left arm Left arm   Patient Position: Lying Lying Lying Lying   Pulse: 73 71 74 69   Resp: 12 14 18 18   Temp: 97.9 °F (36.6 °C) 97.4 °F (36.3 °C) 97.6 °F (36.4 °C) 97.6 °F (36.4 °C)   TempSrc: Oral Oral Oral Oral   SpO2: 90% 90% 90% 90%   Weight:       Height:           Physical Exam: 87 y.o. female    General Appearance:       Alert, cooperative, in no acute distress                  Extremities:    Dressing Clean, Dry and Intact         Incision healthy without signs or symptoms of infections         No clinical sign of DVT        Able to do good movements of digits    Pulses:   Pulses palpable and equal bilaterally           Diagnostic Tests:       Results from last 7 days  Lab Units 09/18/17  0650 09/17/17  0534 09/16/17  0907   WBC 10*3/mm3 6.69 6.11 7.99   HEMOGLOBIN g/dL 8.8* 8.2* 8.9*   HEMATOCRIT % 27.9* 26.1* 27.9*   PLATELETS 10*3/mm3 206 183 202       Results from last 7 days  Lab Units 09/17/17  0534 09/16/17  0907 09/15/17  0635   SODIUM mmol/L 133* 131* 130*   POTASSIUM mmol/L 4.2 4.6 4.6   CHLORIDE mmol/L 99 98 94*   CO2 mmol/L 19.8* 19.8* 20.3*   BUN mg/dL 40* 34* 29*   CREATININE mg/dL 0.85 0.87 0.83   GLUCOSE mg/dL 111* 107* 112*   CALCIUM mg/dL 8.8 9.1 9.6       Results from last 7 days  Lab Units 09/18/17  0653 09/17/17  1445 09/17/17  0534   INR  3.69* 5.84* 5.63*     No results found for: CRYSTAL]  No results found  for: CULTURE]  No results found for: URICACID]  Xr Pelvis 1 Or 2 View    Result Date: 9/18/2017  Narrative: AP PELVIS 09/17/2017  HISTORY: Left hip instability. Pain.  The acetabular and proximal femoral components of the left hip prosthesis appear in good position. No dislocations are seen.  No fractures are seen.  This report was finalized on 9/18/2017 7:28 AM by Dr. Tank Pimentel MD.      Fl C Arm During Surgery    Result Date: 9/15/2017  Narrative: This procedure was auto-finalized with no dictation required.    Xr Hip With Or Without Pelvis 1 View Left    Result Date: 9/16/2017  Narrative: EXAMINATION: LEFT HIP RADIOGRAPH  HISTORY: 87-year-old female with a history of left hip pain.  FINDINGS: An AP radiograph of the left hip was obtained that demonstrates evidence of prior left femoral head arthroplasty. Alignment is appropriate. There is no evidence for a fracture or bony abnormality. Surrounding soft tissue structures have a normal appearance.      Impression: Evidence of prior left femoral head arthroplasty without evidence for an acute abnormality of the left hip.  This report was finalized on 9/16/2017 7:42 PM by Dr. Erwin Yanez MD.      Xr Hip With Or Without Pelvis 1 View Left    Result Date: 9/15/2017  Narrative: INTRAOPERATIVE RADIOGRAPHIC VIEWS OF THE RIGHT HIP  TECHNIQUE: Two intraoperative radiographic views of the right hip are submitted for interpretation.  FINDINGS: These demonstrate a right hip arthroplasty. On one of the images, this hip prosthesis is dislocated posteriorly. This first image is timed at 5:03 PM. On the second image timed at 5:09 PM, it is well located in the acetabulum. 31 seconds of fluoroscopic time were utilized during the course of the procedure.  This report was finalized on 9/15/2017 7:10 PM by Dr. Zia Pandey MD.      Xr Hip With Or Without Pelvis 2 - 3 View Left    Result Date: 9/15/2017  Narrative: EMERGENCY IMAGING LEFT HIP AND PELVIS 2 VIEWS  HISTORY:  Posterior medical tip pain  COMPARISON: 08/18/2017  FINDINGS: 1. Dislocation of the left hip prosthesis. 2. Can't rule out fracture of the lesser trochanter. 3. No pelvic fracture noted.  This report was finalized on 9/15/2017 7:51 AM by Dr. Víctor Garcia MD.          Current Medications:  Scheduled Meds:  amLODIPine 5 mg Oral Daily   aspirin 81 mg Oral Daily   atorvastatin 80 mg Oral Daily   carvedilol 12.5 mg Oral BID With Meals   folic acid 1 mg Oral Daily   gabapentin 100 mg Oral Daily   levothyroxine 125 mcg Oral Daily   lidocaine 1 patch Transdermal BID   montelukast 10 mg Oral Nightly   pantoprazole 40 mg Oral Daily   sertraline 50 mg Oral Daily   vitamin B-12 1,000 mcg Oral Daily     Continuous Infusions:  lactated ringers 9 mL/hr    sodium chloride 50 mL/hr Last Rate: 50 mL/hr (09/17/17 0534)     PRN Meds:.•  acetaminophen  •  fluticasone  •  Morphine **AND** naloxone  •  ondansetron  •  sodium chloride    Assessment: Status post  CA CLOSED RX TRAUMATIC HIP DISLOCATN [39237] (LT HIP CLOSED REDUCTION)    Patient Active Problem List   Diagnosis   • Subcapital fracture of hip   • Atrial fibrillation   • HX: long term anticoagulant use   • Hypothyroidism   • HTN (hypertension)   • Anemia   • Dislocation of left hip   • History of left hip replacement   • NSTEMI (non-ST elevated myocardial infarction)   • Coumadin toxicity   • Instability of prosthetic hip       PLAN:   Pain control: prn tylenol   Anticoagulation: INR down to 3.6 today  Plan for revision of hip tomorrow 9/19, NPO after midnight     Weight Bearing: WBAT  Discharge Plan:pending surgery     Olayinka Krishnamurthy MD    Date: 9/18/2017    Time: 8:35 AM     Patient evaluated by me , Family elected to proceed with palliation. Will hold off on surgery.     Iam Greene MD

## 2017-09-18 NOTE — PROGRESS NOTES
[facility]     LOS: 4 days     Subjective:  No cp, no soa, hip is doing ok, morphine x1 this am    Not wanting to eat, esure    Grand-daughters have visited and son has called    No n/v/d      Objective:    Vital Signs:  Temp:  [97.4 °F (36.3 °C)-97.9 °F (36.6 °C)] 97.6 °F (36.4 °C)  Heart Rate:  [69-74] 69  Resp:  [12-18] 18  BP: (115-133)/(61-76) 133/65    General Appearance: no acute distress, appears comfortable  Heart: regular rate and rhythm  Lungs: clear to auscultation bilaterally, respirations unlabored, good air entry  Abdomen: soft, non-tender, no guarding, no rebound, non-distended  Extremities: no edema, no cyanosis  Neurology: speech normal, CN grossly normal, confused, says she is in [place].   Psychiatric: normal mood and affect    Results Review:    Glucose   Date Value Ref Range Status   09/18/2017 112 (H) 65 - 99 mg/dL Final   09/17/2017 111 (H) 65 - 99 mg/dL Final   09/16/2017 107 (H) 65 - 99 mg/dL Final       Results from last 7 days  Lab Units 09/18/17  0650   WBC 10*3/mm3 6.69   HEMOGLOBIN g/dL 8.8*   HEMATOCRIT % 27.9*   PLATELETS 10*3/mm3 206       Results from last 7 days  Lab Units 09/18/17  0842   SODIUM mmol/L 134*   POTASSIUM mmol/L 3.9   CHLORIDE mmol/L 99   CO2 mmol/L 17.9*   BUN mg/dL 39*   CREATININE mg/dL 0.80   CALCIUM mg/dL 8.9   BILIRUBIN mg/dL 0.9   ALK PHOS U/L 101   ALT (SGPT) U/L 15   AST (SGOT) U/L 57*   GLUCOSE mg/dL 112*       Results from last 7 days  Lab Units 09/18/17  0653   INR  3.69*             Results from last 7 days  Lab Units 09/16/17  0907 09/15/17  0635   TROPONIN T ng/mL 2.200* 3.050*                             I have reviewed daily medications and changes in CPOE    Scheduled meds    amLODIPine 5 mg Oral Daily   aspirin 81 mg Oral Daily   atorvastatin 80 mg Oral Daily   carvedilol 12.5 mg Oral BID With Meals   folic acid 1 mg Oral Daily   gabapentin 100 mg Oral Daily   levothyroxine 125 mcg Oral Daily   lidocaine 1 patch  Transdermal BID   montelukast 10 mg Oral Nightly   pantoprazole 40 mg Oral Daily   [START ON 9/19/2017] orthopedic surgery cocktail 2 ( [facility])  Injection Once   sertraline 50 mg Oral Daily   vitamin B-12 1,000 mcg Oral Daily         lactated ringers 9 mL/hr    sodium chloride 50 mL/hr Last Rate: 50 mL/hr (09/17/17 0534)     PRN meds  •  acetaminophen  •  fluticasone  •  Morphine **AND** naloxone  •  ondansetron  •  sodium chloride      Principal Problem:    Instability of prosthetic hip  Active Problems:    Atrial fibrillation    HX: long term anticoagulant use    Hypothyroidism    HTN (hypertension)    Anemia    Dislocation of left hip    History of left hip replacement    NSTEMI (non-ST elevated myocardial infarction)    Coumadin toxicity        Assessment/Plan:      Instability of prosthetic hip- s/p reduction, patient is not wanting anymore surgery and is not eating      Atrial fibrillation- paced on the monitor      HX: long term anticoagulant use, inr is high      Hypothyroidism- on replacement, tsh 10 on 8/17 and 5.7 on 9/16. Given the MI would keep the dose the same for now.      HTN (hypertension)      Anemia      Dislocation of left hip      History of left hip replacement      NSTEMI (non-ST elevated myocardial infarction)-troponin was down 9/16. Cardiology is following. Aspirin, lipitor, coreg, norvasc and INR is high     HTN- as above      Coumadin toxicity- inr continues to climb, vit k    Plan:  Mostly comfort measures, will continue a small amount of iv fluids, check labs in the am, palliative order set    Will likely stop the oral medications in the next 1-2 days if not able to take po    No more orthopedic surgery    Talked with patients son and he in agreement with the patient's wishes to be kept comfortable.    Transfer to the palliative care unit      Mika Ledbetter MD  09/18/17  1:25 PM

## 2017-09-18 NOTE — PLAN OF CARE
Problem: Patient Care Overview (Adult)  Goal: Plan of Care Review  Outcome: Ongoing (interventions implemented as appropriate)    09/18/17 1130   Coping/Psychosocial Response Interventions   Plan Of Care Reviewed With patient;family   Outcome Evaluation   Outcome Summary/Follow up Plan Re-eval of swallow completed. Pt's family voiced desire for comfort measures. SLP notified RN/         Problem: Inpatient SLP  Goal: Dysphagia- Patient will safely consume diet as per recommendation with no signs/symptoms of aspiration  Outcome: Ongoing (interventions implemented as appropriate)    09/18/17 1130   Safely Consume Diet   Safely Consume Diet- SLP, Date Established 09/18/17   Safely Consume Diet- SLP, Time to Achieve by discharge   Safely Consume Diet- SLP, Outcome goal ongoing

## 2017-09-19 NOTE — PROGRESS NOTES
[facility]     LOS: 5 days     Subjective:  No cp, no soa, hip is doing ok, morphine x1 this am    Not wanting to eat, esure    Grand-daughters have visited and son has called    No n/v/d      Objective:    Vital Signs:  Temp:  [95 °F (35 °C)-96.6 °F (35.9 °C)] 96.6 °F (35.9 °C)  Heart Rate:  [75] 75  Resp:  [14-16] 16  BP: (120-132)/(69-74) 120/69    General Appearance: no acute distress, appears comfortable  Not responding much to sternal rub  Respirations nonlabored, no apnea    Results Review:    Glucose   Date Value Ref Range Status   09/18/2017 112 (H) 65 - 99 mg/dL Final   09/17/2017 111 (H) 65 - 99 mg/dL Final       Results from last 7 days  Lab Units 09/18/17  0650   WBC 10*3/mm3 6.69   HEMOGLOBIN g/dL 8.8*   HEMATOCRIT % 27.9*   PLATELETS 10*3/mm3 206       Results from last 7 days  Lab Units 09/18/17  0842   SODIUM mmol/L 134*   POTASSIUM mmol/L 3.9   CHLORIDE mmol/L 99   CO2 mmol/L 17.9*   BUN mg/dL 39*   CREATININE mg/dL 0.80   CALCIUM mg/dL 8.9   BILIRUBIN mg/dL 0.9   ALK PHOS U/L 101   ALT (SGPT) U/L 15   AST (SGOT) U/L 57*   GLUCOSE mg/dL 112*       Results from last 7 days  Lab Units 09/18/17  0653   INR  3.69*             Results from last 7 days  Lab Units 09/16/17  0907 09/15/17  0635   TROPONIN T ng/mL 2.200* 3.050*                             I have reviewed daily medications and changes in CPOE    Scheduled meds    amLODIPine 5 mg Oral Daily   aspirin 81 mg Oral Daily   atorvastatin 80 mg Oral Daily   carvedilol 12.5 mg Oral BID With Meals   folic acid 1 mg Oral Daily   gabapentin 100 mg Oral Daily   glycopyrrolate 0.1 mg Intravenous Once   levothyroxine 125 mcg Oral Daily   lidocaine 1 patch Transdermal Q24H   montelukast 10 mg Oral Nightly   pantoprazole 40 mg Oral Daily   orthopedic surgery cocktail 2 ( [facility])  Injection Once   sertraline 50 mg Oral Daily   vitamin B-12 1,000 mcg Oral Daily         lactated ringers 9 mL/hr Last Rate: Stopped (09/19/17 8422)    sodium chloride 50 mL/hr Last Rate: 50 mL/hr (09/18/17 8977)     PRN meds  •  acetaminophen **OR** acetaminophen **OR** acetaminophen  •  acetaminophen  •  diphenoxylate-atropine  •  fluticasone  •  Glycerin-Hypromellose-  •  glycopyrrolate **OR** glycopyrrolate **OR** glycopyrrolate **OR** glycopyrrolate  •  LORazepam **OR** LORazepam **OR** LORazepam **OR** LORazepam **OR** LORazepam  •  LORazepam **OR** LORazepam **OR** LORazepam **OR** LORazepam **OR** LORazepam  •  LORazepam **OR** LORazepam **OR** LORazepam **OR** LORazepam **OR** LORazepam  •  LORazepam  •  Morphine **OR** morphine  •  Morphine **OR** morphine  •  Morphine **OR** morphine  •  Morphine **AND** naloxone  •  ondansetron  •  sodium chloride      Principal Problem:    Instability of prosthetic hip  Active Problems:    Atrial fibrillation    HX: long term anticoagulant use    Hypothyroidism    HTN (hypertension)    Anemia    Dislocation of left hip    History of left hip replacement    NSTEMI (non-ST elevated myocardial infarction)    Coumadin toxicity        Assessment/Plan:      Instability of prosthetic hip- s/p reduction, patient is not wanting anymore surgery and is not eating      Atrial fibrillation- paced on the monitor      HX: long term anticoagulant use, inr is high      Hypothyroidism- on replacement, tsh 10 on 8/17 and 5.7 on 9/16. Given the MI would keep the dose the same for now.      HTN (hypertension)      Anemia      Dislocation of left hip      History of left hip replacement      NSTEMI (non-ST elevated myocardial infarction)-troponin was down 9/16. Cardiology is following. Aspirin, lipitor, coreg, norvasc and INR is high     HTN- as above      Coumadin toxicity- inr continues to climb, vit k    Plan:  Palliative care    She has a pacer and 100% paced per the jagruti, asked the nurse to call cardiology but probably not much to do.    >25 minutes spent, > 1/2 time spent counseling and coordination of care      Mika Ledbetter MD  09/19/17  5:27 PM

## 2017-09-19 NOTE — PLAN OF CARE
Problem: Patient Care Overview (Adult)  Goal: Plan of Care Review  Outcome: Ongoing (interventions implemented as appropriate)    09/19/17 8536   Coping/Psychosocial Response Interventions   Plan Of Care Reviewed With son;family   Patient Care Overview   Progress declining   Outcome Evaluation   Outcome Summary/Follow up Plan IVF stopped per son request. patient premedicated prior to turns. patient is unresponsive. will continue to monitor patient and treat with comfort orders.         Problem: Dying Patient, Actively (Adult)  Goal: Comfort/Pain Control  Outcome: Ongoing (interventions implemented as appropriate)  Goal: Dying Process, Peace and Dignity  Outcome: Ongoing (interventions implemented as appropriate)    Problem: Fall Risk (Adult)  Goal: Absence of Falls  Outcome: Ongoing (interventions implemented as appropriate)    Problem: Pressure Ulcer Risk (Miguelito Scale) (Adult,Obstetrics,Pediatric)  Goal: Skin Integrity  Outcome: Ongoing (interventions implemented as appropriate)

## 2017-09-19 NOTE — PLAN OF CARE
Problem: Patient Care Overview (Adult)  Goal: Plan of Care Review  Outcome: Ongoing (interventions implemented as appropriate)    09/19/17 0592   Coping/Psychosocial Response Interventions   Plan Of Care Reviewed With patient   Patient Care Overview   Progress no change   Outcome Evaluation   Outcome Summary/Follow up Plan iv fluids continue. plan to d/c today. patient medicated with morphine and ativan prior to turns to helps rest better. patient refusing oral medications at beginning of shift. only oriented to self, abduction pillow still present between legs. de la cruz in place and draining. will continue to provide comfort care.         Problem: Dying Patient, Actively (Adult)  Goal: Identify Related Risk Factors and Signs and Symptoms  Outcome: Outcome(s) achieved Date Met:  09/19/17  Goal: Comfort/Pain Control  Outcome: Ongoing (interventions implemented as appropriate)  Goal: Dying Process, Peace and Dignity  Outcome: Ongoing (interventions implemented as appropriate)    Problem: Fall Risk (Adult)  Goal: Identify Related Risk Factors and Signs and Symptoms  Outcome: Outcome(s) achieved Date Met:  09/19/17  Goal: Absence of Falls  Outcome: Ongoing (interventions implemented as appropriate)    Problem: Pressure Ulcer Risk (Miguelito Scale) (Adult,Obstetrics,Pediatric)  Goal: Identify Related Risk Factors and Signs and Symptoms  Outcome: Outcome(s) achieved Date Met:  09/19/17  Goal: Skin Integrity  Outcome: Ongoing (interventions implemented as appropriate)

## 2017-09-20 NOTE — PROGRESS NOTES
Case Management Discharge Note    Final Note: The patient  on 17 @ 03:40. LEDY Low RN, CCP    Discharge Placement     No information found             Discharge Codes: 20

## 2020-04-03 NOTE — NURSING NOTE
Discharge complete, but waiting on facility to call back with bed   Lab Facility: [id number] Lab Facility: [id number]

## (undated) DEVICE — MAT FLR ABSORBENT LG 4FT 10 2.5FT

## (undated) DEVICE — SYR CONTRL LUERLOK 10CC

## (undated) DEVICE — HANDPIECE SET WITH COAXIAL HIGH FLOW TIP AND SUCTION TUBE: Brand: INTERPULSE

## (undated) DEVICE — PREP IM ENCHANCED TOTAL HIP BONE                                    PREPARATION KIT: Brand: PREP-IM

## (undated) DEVICE — ENCORE® LATEX ORTHO SIZE 8, STERILE LATEX POWDER-FREE SURGICAL GLOVE: Brand: ENCORE

## (undated) DEVICE — CONTAINER,SPECIMEN,OR STERILE,4OZ: Brand: MEDLINE

## (undated) DEVICE — DRAPE,HIP,W/POUCHES,STERILE: Brand: MEDLINE

## (undated) DEVICE — ENCORE® LATEX ORTHO SIZE 6.5, STERILE LATEX POWDER-FREE SURGICAL GLOVE: Brand: ENCORE

## (undated) DEVICE — GLV SURG TRIUMPH CLASSIC PF LTX 6.5 STRL

## (undated) DEVICE — COAXIAL FEMORAL CANAL TIP

## (undated) DEVICE — 3M™ STERI-STRIP™ REINFORCED ADHESIVE SKIN CLOSURES, R1547, 1/2 IN X 4 IN (12 MM X 100 MM), 6 STRIPS/ENVELOPE: Brand: 3M™ STERI-STRIP™

## (undated) DEVICE — GLV SURG BIOGEL LTX PF 6 1/2

## (undated) DEVICE — ANTIBACTERIAL UNDYED BRAIDED (POLYGLACTIN 910), SYNTHETIC ABSORBABLE SUTURE: Brand: COATED VICRYL

## (undated) DEVICE — APPL CHLORAPREP W/TINT 26ML ORNG

## (undated) DEVICE — SUT VIC 1 CT1 36IN J947H

## (undated) DEVICE — DECANT BG O JET

## (undated) DEVICE — DRAPE,REIN 53X77,STERILE: Brand: MEDLINE

## (undated) DEVICE — CEMENT MIXING SYSTEM WITH FEMORAL BREAKWAY NOZZLE: Brand: REVOLUTION

## (undated) DEVICE — RECIPROCATING BLADE HEAVY DUTY LONG, OFFSET  (77.6 X 0.77 X 11.2MM)

## (undated) DEVICE — INTENDED FOR TISSUE SEPARATION, AND OTHER PROCEDURES THAT REQUIRE A SHARP SURGICAL BLADE TO PUNCTURE OR CUT.: Brand: BARD-PARKER ® CARBON RIB-BACK BLADES

## (undated) DEVICE — PK HIP TOTL 40

## (undated) DEVICE — 1000 S-DRAPE TOWEL DRAPE 10/BX: Brand: STERI-DRAPE™

## (undated) DEVICE — PAD,ABDOMINAL,8"X10",ST,LF: Brand: MEDLINE

## (undated) DEVICE — GLV SURG TRIUMPH CLASSIC PF LTX 8 STRL

## (undated) DEVICE — SKIN PREP TRAY W/CHG: Brand: MEDLINE INDUSTRIES, INC.

## (undated) DEVICE — GLV SURG BIOGEL LTX PF 8

## (undated) DEVICE — OCCLUSIVE GAUZE STRIP,3% BISMUTH TRIBROMOPHENATE IN PETROLATUM BLEND: Brand: XEROFORM

## (undated) DEVICE — SUT ETHIB 0 CT1 CR8 18IN CX21D

## (undated) DEVICE — SPNG GZ WOVN 4X4IN 12PLY 10/BX STRL

## (undated) DEVICE — PROXIMATE RH ROTATING HEAD SKIN STAPLERS (35 WIDE) CONTAINS 35 STAINLESS STEEL STAPLES: Brand: PROXIMATE

## (undated) DEVICE — HEWSON SUTURE RETRIEVER: Brand: HEWSON SUTURE RETRIEVER

## (undated) DEVICE — SOL ISO/ALC RUB 70PCT 4OZ